# Patient Record
Sex: MALE | Race: WHITE | NOT HISPANIC OR LATINO | Employment: OTHER | URBAN - METROPOLITAN AREA
[De-identification: names, ages, dates, MRNs, and addresses within clinical notes are randomized per-mention and may not be internally consistent; named-entity substitution may affect disease eponyms.]

---

## 2019-06-03 ENCOUNTER — TELEPHONE (OUTPATIENT)
Dept: FAMILY MEDICINE | Facility: CLINIC | Age: 84
End: 2019-06-03

## 2019-06-03 ENCOUNTER — OFFICE VISIT (OUTPATIENT)
Dept: FAMILY MEDICINE | Facility: CLINIC | Age: 84
End: 2019-06-03
Attending: FAMILY MEDICINE
Payer: MEDICARE

## 2019-06-03 ENCOUNTER — HOSPITAL ENCOUNTER (OUTPATIENT)
Dept: RADIOLOGY | Facility: HOSPITAL | Age: 84
Discharge: HOME | End: 2019-06-03
Attending: FAMILY MEDICINE
Payer: MEDICARE

## 2019-06-03 VITALS
BODY MASS INDEX: 23.46 KG/M2 | WEIGHT: 167.56 LBS | OXYGEN SATURATION: 97 % | SYSTOLIC BLOOD PRESSURE: 138 MMHG | HEIGHT: 71 IN | DIASTOLIC BLOOD PRESSURE: 66 MMHG | TEMPERATURE: 98 F | HEART RATE: 67 BPM

## 2019-06-03 DIAGNOSIS — J01.40 ACUTE PANSINUSITIS, RECURRENCE NOT SPECIFIED: ICD-10-CM

## 2019-06-03 DIAGNOSIS — R06.02 SOB (SHORTNESS OF BREATH): Primary | ICD-10-CM

## 2019-06-03 DIAGNOSIS — E03.9 HYPOTHYROIDISM, UNSPECIFIED TYPE: ICD-10-CM

## 2019-06-03 DIAGNOSIS — J01.40 ACUTE PANSINUSITIS, RECURRENCE NOT SPECIFIED: Primary | ICD-10-CM

## 2019-06-03 DIAGNOSIS — R53.1 WEAKNESS: ICD-10-CM

## 2019-06-03 DIAGNOSIS — R06.02 SOB (SHORTNESS OF BREATH): ICD-10-CM

## 2019-06-03 PROBLEM — H40.9 GLAUCOMA: Status: ACTIVE | Noted: 2019-06-03

## 2019-06-03 PROCEDURE — 99999 PR PBB SHADOW E&M-NEW PATIENT-LVL IV: CPT | Mod: PBBFAC,,, | Performed by: FAMILY MEDICINE

## 2019-06-03 PROCEDURE — 99999 PR PBB SHADOW E&M-NEW PATIENT-LVL IV: ICD-10-PCS | Mod: PBBFAC,,, | Performed by: FAMILY MEDICINE

## 2019-06-03 PROCEDURE — 99204 OFFICE O/P NEW MOD 45 MIN: CPT | Mod: PBBFAC,25,PO | Performed by: FAMILY MEDICINE

## 2019-06-03 PROCEDURE — 71046 X-RAY EXAM CHEST 2 VIEWS: CPT | Mod: 26,,, | Performed by: RADIOLOGY

## 2019-06-03 PROCEDURE — 71046 XR CHEST PA AND LATERAL: ICD-10-PCS | Mod: 26,,, | Performed by: RADIOLOGY

## 2019-06-03 PROCEDURE — 99204 OFFICE O/P NEW MOD 45 MIN: CPT | Mod: S$PBB,,, | Performed by: FAMILY MEDICINE

## 2019-06-03 PROCEDURE — 99204 PR OFFICE/OUTPT VISIT, NEW, LEVL IV, 45-59 MIN: ICD-10-PCS | Mod: S$PBB,,, | Performed by: FAMILY MEDICINE

## 2019-06-03 PROCEDURE — 71046 X-RAY EXAM CHEST 2 VIEWS: CPT | Mod: TC,PO

## 2019-06-03 RX ORDER — BRIMONIDINE TARTRATE 2 MG/ML
1 SOLUTION/ DROPS OPHTHALMIC 2 TIMES DAILY
Refills: 4 | COMMUNITY
Start: 2019-04-02 | End: 2023-12-14 | Stop reason: SDUPTHER

## 2019-06-03 RX ORDER — LATANOPROST 50 UG/ML
1 SOLUTION/ DROPS OPHTHALMIC NIGHTLY
COMMUNITY
Start: 2018-08-31 | End: 2023-12-14 | Stop reason: SDUPTHER

## 2019-06-03 RX ORDER — BENZONATATE 200 MG/1
200 CAPSULE ORAL 2 TIMES DAILY PRN
Qty: 20 CAPSULE | Refills: 0 | Status: SHIPPED | OUTPATIENT
Start: 2019-06-03 | End: 2019-06-13

## 2019-06-03 RX ORDER — BRIMONIDINE TARTRATE AND TIMOLOL MALEATE 2; 5 MG/ML; MG/ML
1 SOLUTION OPHTHALMIC 2 TIMES DAILY
COMMUNITY
End: 2023-03-30

## 2019-06-03 RX ORDER — FINASTERIDE 5 MG/1
1 TABLET, FILM COATED ORAL DAILY
Refills: 11 | COMMUNITY
Start: 2019-05-11 | End: 2021-05-05

## 2019-06-03 RX ORDER — FLUTICASONE PROPIONATE 50 MCG
2 SPRAY, SUSPENSION (ML) NASAL
COMMUNITY
Start: 2018-04-02 | End: 2023-03-17 | Stop reason: ALTCHOICE

## 2019-06-03 RX ORDER — LEVOTHYROXINE SODIUM 75 UG/1
75 TABLET ORAL DAILY
Refills: 0 | COMMUNITY
Start: 2019-05-16 | End: 2019-10-16 | Stop reason: SDUPTHER

## 2019-06-03 RX ORDER — TIMOLOL MALEATE 5 MG/ML
1 SOLUTION/ DROPS OPHTHALMIC 2 TIMES DAILY
Refills: 4 | Status: ON HOLD | COMMUNITY
Start: 2019-04-02 | End: 2023-03-31 | Stop reason: HOSPADM

## 2019-06-03 RX ORDER — AMOXICILLIN AND CLAVULANATE POTASSIUM 875; 125 MG/1; MG/1
1 TABLET, FILM COATED ORAL EVERY 12 HOURS
Qty: 20 TABLET | Refills: 0 | Status: SHIPPED | OUTPATIENT
Start: 2019-06-03 | End: 2019-06-24 | Stop reason: ALTCHOICE

## 2019-06-03 NOTE — PROGRESS NOTES
Subjective:       Patient ID: Kwame Vásquez Jr. is a 89 y.o. male.    Chief Complaint: Cough (x 5 days) and Chest Congestion (x 5 days)    89 y old male with  Hypothyroidism, BPH , glaucoma resident at [facility] with non productive cough , chest congestion and malaise for 5 days . Taking tylenol . No cp , sob     Review of Systems   Constitutional: Negative.    HENT: Negative.    Eyes: Negative.    Respiratory: Positive for cough and chest tightness.    Cardiovascular: Negative.    Gastrointestinal: Negative.    Genitourinary: Negative.    Musculoskeletal: Negative.    Skin: Negative.    Hematological: Negative.        Objective:      Physical Exam   Constitutional: He is oriented to person, place, and time. He appears well-developed and well-nourished. No distress.   HENT:   Head: Normocephalic and atraumatic.   Right Ear: External ear normal.   Left Ear: External ear normal.   Nose: Nose normal.   Mouth/Throat: No oropharyngeal exudate.   Eyes: Pupils are equal, round, and reactive to light. Conjunctivae and EOM are normal. Right eye exhibits no discharge. Left eye exhibits no discharge. No scleral icterus.   Neck: Normal range of motion. Neck supple. No JVD present. No tracheal deviation present. No thyromegaly present.   Cardiovascular: Normal rate, regular rhythm, normal heart sounds and intact distal pulses. Exam reveals no gallop and no friction rub.   No murmur heard.  Pulmonary/Chest: Effort normal. No stridor. No respiratory distress. He has decreased breath sounds in the right upper field and the left upper field. He has wheezes in the right upper field and the right middle field. He has no rales. He exhibits no tenderness.   Abdominal: Soft. Bowel sounds are normal. He exhibits no distension. There is no tenderness. There is no rebound and no guarding.   Musculoskeletal: Normal range of motion. He exhibits no edema or tenderness.   Lymphadenopathy:     He has no cervical adenopathy.   Neurological:  He is alert and oriented to person, place, and time. He has normal reflexes. He displays normal reflexes. No cranial nerve deficit. He exhibits normal muscle tone. Coordination normal.   Skin: Skin is warm and dry. No rash noted. He is not diaphoretic. No erythema. No pallor.   Psychiatric: He has a normal mood and affect. His behavior is normal. Judgment and thought content normal.       Assessment:       SOB (shortness of breath)  -     X-Ray Chest PA And Lateral; Future; Expected date: 06/03/2019    Hypothyroidism, unspecified type    Acute pansinusitis, recurrence not specified    Other orders  -     amoxicillin-clavulanate 875-125mg (AUGMENTIN) 875-125 mg per tablet; Take 1 tablet by mouth every 12 (twelve) hours.  Dispense: 20 tablet; Refill: 0  -     benzonatate (TESSALON) 200 MG capsule; Take 1 capsule (200 mg total) by mouth 2 (two) times daily as needed for Cough.  Dispense: 20 capsule; Refill: 0      Plan:     Kwame was seen today for cough and chest congestion.    Diagnoses and all orders for this visit:    SOB (shortness of breath)  -     X-Ray Chest PA And Lateral; Future     /Call or return to clinic prn if these symptoms worsen or fail to improve as anticipated.

## 2019-06-03 NOTE — TELEPHONE ENCOUNTER
Pts granddaughter requesting an external referral for home health. Pts granddaughter Chanel states she would like the referral to be faxed to [facility] in [place]. Please advise.

## 2019-06-03 NOTE — TELEPHONE ENCOUNTER
Attempted to contact pts grandchild Chanel to informed her the referral has been faxed to [facility], no answer. Unable to leave message at this time.

## 2019-06-04 ENCOUNTER — TELEPHONE (OUTPATIENT)
Dept: FAMILY MEDICINE | Facility: CLINIC | Age: 84
End: 2019-06-04

## 2019-06-04 NOTE — TELEPHONE ENCOUNTER
----- Message from Charity Valdovinos sent at 6/4/2019  1:26 PM CDT -----  Contact: [facility]/Home Health/Nurse  Please call nurse @ [phone] regarding pt order for Home Health, states order not complete, information missing, nurse would like to speak with nurse Rodriguez.

## 2019-06-04 NOTE — TELEPHONE ENCOUNTER
----- Message from Karma Eldridge sent at 6/4/2019  3:09 PM CDT -----  Contact: [facility]  Zi is calling to speak with Jennifer in regards to referral that was sent for pt. No other details were given. Please call Zi back at [phone].    Thanks,   Karma Eldridge

## 2019-06-04 NOTE — TELEPHONE ENCOUNTER
Spoke with Zi at [facility], she states that they cannot admit patient for  services for a diagnosis of sinusitis. She states that they will contact the patient to let him know.

## 2019-06-04 NOTE — TELEPHONE ENCOUNTER
Spoke with Zi from [facility] requesting for Dr. Briscoe office note and H&P. She states the pt was recently discharged from their home health in May due to goals met. Verbalized understanding, office note and H&P faxed.

## 2019-06-24 ENCOUNTER — HOSPITAL ENCOUNTER (OUTPATIENT)
Dept: RADIOLOGY | Facility: HOSPITAL | Age: 84
Discharge: HOME | End: 2019-06-24
Attending: FAMILY MEDICINE
Payer: MEDICARE

## 2019-06-24 ENCOUNTER — OFFICE VISIT (OUTPATIENT)
Dept: FAMILY MEDICINE | Facility: CLINIC | Age: 84
End: 2019-06-24
Attending: FAMILY MEDICINE
Payer: MEDICARE

## 2019-06-24 VITALS
HEIGHT: 71 IN | BODY MASS INDEX: 23.95 KG/M2 | TEMPERATURE: 98 F | SYSTOLIC BLOOD PRESSURE: 142 MMHG | HEART RATE: 66 BPM | WEIGHT: 171.06 LBS | DIASTOLIC BLOOD PRESSURE: 64 MMHG | OXYGEN SATURATION: 97 %

## 2019-06-24 DIAGNOSIS — R29.6 FREQUENT FALLS: ICD-10-CM

## 2019-06-24 DIAGNOSIS — Z78.9 DECREASED ACTIVITIES OF DAILY LIVING (ADL): ICD-10-CM

## 2019-06-24 DIAGNOSIS — R79.9 ABNORMAL FINDING OF BLOOD CHEMISTRY: ICD-10-CM
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
## 2019-06-24 DIAGNOSIS — E03.9 HYPOTHYROIDISM, UNSPECIFIED TYPE: ICD-10-CM

## 2019-06-24 DIAGNOSIS — M94.9 DISORDER OF CARTILAGE: ICD-10-CM

## 2019-06-24 DIAGNOSIS — E78.5 DYSLIPIDEMIA: ICD-10-CM

## 2019-06-24 PROCEDURE — 99999 PR PBB SHADOW E&M-EST. PATIENT-LVL IV: ICD-10-PCS | Mod: PBBFAC,,, | Performed by: FAMILY MEDICINE

## 2019-06-24 PROCEDURE — 71110 X-RAY EXAM RIBS BIL 3 VIEWS: CPT | Mod: 26,,, | Performed by: RADIOLOGY

## 2019-06-24 PROCEDURE — 99214 OFFICE O/P EST MOD 30 MIN: CPT | Mod: S$PBB,,, | Performed by: FAMILY MEDICINE

## 2019-06-24 PROCEDURE — 99999 PR PBB SHADOW E&M-EST. PATIENT-LVL IV: CPT | Mod: PBBFAC,,, | Performed by: FAMILY MEDICINE

## 2019-06-24 PROCEDURE — 99214 PR OFFICE/OUTPT VISIT, EST, LEVL IV, 30-39 MIN: ICD-10-PCS | Mod: S$PBB,,, | Performed by: FAMILY MEDICINE

## 2019-06-24 PROCEDURE — 99214 OFFICE O/P EST MOD 30 MIN: CPT | Mod: PBBFAC,PO | Performed by: FAMILY MEDICINE

## 2019-06-24 PROCEDURE — 71110 XR RIBS 3 VIEWS BILATERAL: ICD-10-PCS | Mod: 26,,, | Performed by: RADIOLOGY

## 2019-06-24 PROCEDURE — 71110 X-RAY EXAM RIBS BIL 3 VIEWS: CPT | Mod: TC,PO

## 2019-06-25 ENCOUNTER — TELEPHONE (OUTPATIENT)
Dept: FAMILY MEDICINE | Facility: CLINIC | Age: 84
End: 2019-06-25

## 2019-06-25 NOTE — TELEPHONE ENCOUNTER
----- Message from Jennifer Churchill LPN sent at 6/25/2019  3:52 PM CDT -----  Spoke to pts grandchild Chanel about pts test results. Chanel verbalized understanding. Chanel states she would like to have him start the spirometer. Chanel asking if his lung expansion is age appropriate or if it may be from the recent infection? She wants to know what is causing his lungs to not expand properly? Please advise.

## 2019-06-25 NOTE — TELEPHONE ENCOUNTER
----- Message from Sandy Tirado sent at 6/25/2019 11:37 AM CDT -----  Contact: [facility]  States a order was received on this pt but states a face to face visit notes are needed, please fax to [phone], can can be reached at [phone]///[email or web address]

## 2019-06-27 ENCOUNTER — TELEPHONE (OUTPATIENT)
Dept: FAMILY MEDICINE | Facility: CLINIC | Age: 84
End: 2019-06-27

## 2019-06-27 NOTE — TELEPHONE ENCOUNTER
Spoke to pts grand daughter Chanel about pts test results. Chanel verbalized understanding. Chanel states Luz called her and told her the spirometer is not covered under the insurance. Informed Chanel I can place a spirometer at the  for .

## 2019-06-27 NOTE — TELEPHONE ENCOUNTER
----- Message from Elizabeth Boles sent at 6/27/2019  8:11 AM CDT -----  Contact: Chanel/ayesha granddaughter   Caller states that she need the nurse to call her regarding Spirometer that she has to  and if it can be left at the .   [phone]

## 2019-06-28 ENCOUNTER — TELEPHONE (OUTPATIENT)
Dept: FAMILY MEDICINE | Facility: CLINIC | Age: 84
End: 2019-06-28

## 2019-06-28 DIAGNOSIS — R29.6 FREQUENT FALLS: Primary | ICD-10-CM

## 2019-06-28 NOTE — TELEPHONE ENCOUNTER
Spoke with Angelina at [facility], she states that she needs a new order for  dated for 6/25/19 and noted that it is okay to admit patient on 6/26/19. She states that the original date was on 6/24/19 but referral was faxed to them on 6/25/19 and they didn't admit patient until 6/26/19. She states that the state requires 24 hours from order to admit.

## 2019-07-01 ENCOUNTER — TELEPHONE (OUTPATIENT)
Dept: FAMILY MEDICINE | Facility: CLINIC | Age: 84
End: 2019-07-01

## 2019-07-01 DIAGNOSIS — J98.11 ATELECTASIS: ICD-10-CM

## 2019-07-01 DIAGNOSIS — R29.6 FREQUENT FALLS: Primary | ICD-10-CM

## 2019-07-01 NOTE — TELEPHONE ENCOUNTER
Spoke with Pts grand daughter Chanel. Chanel states she is wanting to switch pts [facility] to a company with Respiratory Therapy services. Informed Chanel I will make a few phone calls to different home health services and call her back as soon as I get a respiratory therapist service with [facility]. Chanel verbalized understanding.

## 2019-07-01 NOTE — TELEPHONE ENCOUNTER
----- Message from Kathy Howe sent at 7/1/2019 10:39 AM CDT -----  Type:  Needs Medical Advice    Who Called:  Pt daughter in law (Seda Sierra)  Symptoms (please be specific):     How long has patient had these symptoms:     Pharmacy name and phone #:     Would the patient rather a call back or a response via [facility]?  Call back  Best Call Back Number:   [phone]  Additional Information:   This message is for Lola// is calling regarding home health for pt//please call to discuss//rivas/lukas

## 2019-07-01 NOTE — TELEPHONE ENCOUNTER
Pts grand daughter stating they are wanting to switch [facility]. Company. Please place external referral for home health. Unable to use previous [facility] Referral.

## 2019-07-02 ENCOUNTER — TELEPHONE (OUTPATIENT)
Dept: FAMILY MEDICINE | Facility: CLINIC | Age: 84
End: 2019-07-02

## 2019-07-02 DIAGNOSIS — R29.6 FREQUENT FALLS: Primary | ICD-10-CM

## 2019-07-02 DIAGNOSIS — J98.11 ATELECTASIS: ICD-10-CM

## 2019-07-02 NOTE — TELEPHONE ENCOUNTER
[facility] Requesting orders for a nurse to make visits in the pts ome and also orders for respiratory therapy. Is it okay to give verbal order?

## 2019-07-02 NOTE — TELEPHONE ENCOUNTER
[facility] States since this pt is a new pt to [facility]. The nurse orders and resp therapy orders must be faxed on a hard copy. Please print hard copy.

## 2019-07-03 PROCEDURE — G0180 PR HOME HEALTH MD CERTIFICATION: ICD-10-PCS | Mod: ,,, | Performed by: FAMILY MEDICINE

## 2019-07-03 PROCEDURE — G0180 MD CERTIFICATION HHA PATIENT: HCPCS | Mod: ,,, | Performed by: FAMILY MEDICINE

## 2019-07-16 ENCOUNTER — TELEPHONE (OUTPATIENT)
Dept: FAMILY MEDICINE | Facility: CLINIC | Age: 84
End: 2019-07-16

## 2019-07-16 NOTE — TELEPHONE ENCOUNTER
Attempted to contact pts grandchild Chanel, unable to leave message at this time. Pt needing to come sooner or reschedule his appt for 7/18/19 due to Dr. Mccallum having to leave the office early.

## 2019-07-17 ENCOUNTER — TELEPHONE (OUTPATIENT)
Dept: FAMILY MEDICINE | Facility: CLINIC | Age: 84
End: 2019-07-17

## 2019-07-18 ENCOUNTER — TELEPHONE (OUTPATIENT)
Dept: FAMILY MEDICINE | Facility: CLINIC | Age: 84
End: 2019-07-18

## 2019-07-18 ENCOUNTER — OFFICE VISIT (OUTPATIENT)
Dept: FAMILY MEDICINE | Facility: CLINIC | Age: 84
End: 2019-07-18
Attending: FAMILY MEDICINE
Payer: MEDICARE

## 2019-07-18 VITALS
WEIGHT: 166.31 LBS | SYSTOLIC BLOOD PRESSURE: 130 MMHG | OXYGEN SATURATION: 97 % | DIASTOLIC BLOOD PRESSURE: 64 MMHG | HEART RATE: 68 BPM | TEMPERATURE: 98 F | BODY MASS INDEX: 23.53 KG/M2

## 2019-07-18 DIAGNOSIS — R29.6 FREQUENT FALLS: Primary | ICD-10-CM

## 2019-07-18 DIAGNOSIS — E03.9 HYPOTHYROIDISM, UNSPECIFIED TYPE: ICD-10-CM

## 2019-07-18 DIAGNOSIS — J30.9 ALLERGIC RHINITIS, UNSPECIFIED SEASONALITY, UNSPECIFIED TRIGGER: ICD-10-CM

## 2019-07-18 PROCEDURE — 99999 PR PBB SHADOW E&M-EST. PATIENT-LVL III: CPT | Mod: PBBFAC,,, | Performed by: FAMILY MEDICINE

## 2019-07-18 PROCEDURE — 99999 PR PBB SHADOW E&M-EST. PATIENT-LVL III: ICD-10-PCS | Mod: PBBFAC,,, | Performed by: FAMILY MEDICINE

## 2019-07-18 PROCEDURE — G0009 ADMIN PNEUMOCOCCAL VACCINE: HCPCS | Mod: PBBFAC,PO

## 2019-07-18 PROCEDURE — 99214 PR OFFICE/OUTPT VISIT, EST, LEVL IV, 30-39 MIN: ICD-10-PCS | Mod: 25,S$PBB,, | Performed by: FAMILY MEDICINE

## 2019-07-18 PROCEDURE — 99214 OFFICE O/P EST MOD 30 MIN: CPT | Mod: 25,S$PBB,, | Performed by: FAMILY MEDICINE

## 2019-07-18 PROCEDURE — 99213 OFFICE O/P EST LOW 20 MIN: CPT | Mod: PBBFAC,PO,25 | Performed by: FAMILY MEDICINE

## 2019-07-18 RX ORDER — LEVOTHYROXINE SODIUM 75 UG/1
75 TABLET ORAL
COMMUNITY
Start: 2019-07-09 | End: 2019-10-31

## 2019-07-18 NOTE — TELEPHONE ENCOUNTER
Spoke with pt's grand daughter, Chanel, she states that patient will be late for his 11 am appointment since her dad will be bringing patient. Advised her that if pt is here before 11:30 am then he can be seen. She states that he will definitely be here for then.

## 2019-07-18 NOTE — PROGRESS NOTES
Subjective:       Patient ID: Kwame Vásquez Jr. is a 89 y.o. male.    Chief Complaint: Follow-up    89  y  Old male with hypothyroidism , frequent falls and allergic rhinitis here for f.u . Has started PT twice weekly . No recent  falls. Eating well . Allergies are well controlled.     Review of Systems   Constitutional: Negative.  Negative for activity change and unexpected weight change.   HENT: Negative.  Negative for hearing loss, rhinorrhea and trouble swallowing.    Eyes: Negative.  Negative for discharge and visual disturbance.   Respiratory: Negative.  Negative for chest tightness and wheezing.    Cardiovascular: Negative.  Negative for chest pain and palpitations.   Gastrointestinal: Negative.  Negative for blood in stool, constipation, diarrhea and vomiting.   Endocrine: Negative for polydipsia and polyuria.   Genitourinary: Negative.  Negative for difficulty urinating, hematuria and urgency.   Musculoskeletal: Negative.  Negative for arthralgias, joint swelling and neck pain.   Skin: Negative.    Neurological: Negative for weakness and headaches.   Hematological: Negative.    Psychiatric/Behavioral: Negative for confusion and dysphoric mood.       Objective:      Physical Exam   Constitutional: He is oriented to person, place, and time. He appears well-developed and well-nourished. No distress.   HENT:   Head: Normocephalic and atraumatic.   Right Ear: External ear normal.   Left Ear: External ear normal.   Nose: Nose normal.   Mouth/Throat: No oropharyngeal exudate.   Eyes: Pupils are equal, round, and reactive to light. Conjunctivae and EOM are normal. Right eye exhibits no discharge. Left eye exhibits no discharge. No scleral icterus.   Neck: Normal range of motion. Neck supple. No JVD present. No tracheal deviation present. No thyromegaly present.   Cardiovascular: Normal rate, regular rhythm, normal heart sounds and intact distal pulses. Exam reveals no gallop and no friction rub.   No murmur  heard.  Pulmonary/Chest: Effort normal and breath sounds normal. No stridor. No respiratory distress. He has no wheezes. He has no rales. He exhibits no tenderness.   Abdominal: Soft. Bowel sounds are normal. He exhibits no distension. There is no tenderness. There is no rebound and no guarding.   Musculoskeletal: Normal range of motion. He exhibits no edema or tenderness.   Lymphadenopathy:     He has no cervical adenopathy.   Neurological: He is alert and oriented to person, place, and time. He has normal reflexes. He displays normal reflexes. No cranial nerve deficit. He exhibits normal muscle tone. Coordination normal.   Skin: Skin is warm and dry. No rash noted. He is not diaphoretic. No erythema. No pallor.   Psychiatric: He has a normal mood and affect. His behavior is normal. Judgment and thought content normal.       Assessment:       1. Frequent falls    2. Hypothyroidism, unspecified type    3. Allergic rhinitis, unspecified seasonality, unspecified trigger        Plan:     Kwame was seen today for follow-up.    Diagnoses and all orders for this visit:    Frequent falls    Hypothyroidism, unspecified type    Allergic rhinitis, unspecified seasonality, unspecified trigger    Other orders  -     (In Office Administered) Pneumococcal Polysaccharide Vaccine (23 Valent) (SQ/IM)     Cont PT . F.u in 3 m   Labs   Controlled. Cont med

## 2019-07-18 NOTE — TELEPHONE ENCOUNTER
----- Message from Ann Ruff sent at 7/18/2019  9:56 AM CDT -----  Contact: [phone]  Grand daughter called pt will be late due to transportations. No later 15 mins. please call back [phone].       Thank You,   Ann Ruff

## 2019-07-19 ENCOUNTER — EXTERNAL HOME HEALTH (OUTPATIENT)
Dept: HOME HEALTH SERVICES | Facility: HOSPITAL | Age: 84
End: 2019-07-19
Payer: MEDICARE

## 2019-08-27 ENCOUNTER — TELEPHONE (OUTPATIENT)
Dept: HOME HEALTH SERVICES | Facility: HOSPITAL | Age: 84
End: 2019-08-27

## 2019-10-16 RX ORDER — LEVOTHYROXINE SODIUM 75 UG/1
75 TABLET ORAL DAILY
Qty: 90 TABLET | Refills: 0 | Status: SHIPPED | OUTPATIENT
Start: 2019-10-16 | End: 2019-10-31

## 2019-10-18 ENCOUNTER — OFFICE VISIT (OUTPATIENT)
Dept: FAMILY MEDICINE | Facility: CLINIC | Age: 84
End: 2019-10-18
Attending: FAMILY MEDICINE
Payer: MEDICARE

## 2019-10-18 VITALS
TEMPERATURE: 98 F | DIASTOLIC BLOOD PRESSURE: 64 MMHG | BODY MASS INDEX: 23.61 KG/M2 | SYSTOLIC BLOOD PRESSURE: 118 MMHG | WEIGHT: 168.63 LBS | HEART RATE: 60 BPM | HEIGHT: 71 IN | OXYGEN SATURATION: 98 %

## 2019-10-18 DIAGNOSIS — R79.9 ABNORMAL FINDING OF BLOOD CHEMISTRY, UNSPECIFIED: ICD-10-CM

## 2019-10-18 DIAGNOSIS — E03.9 HYPOTHYROIDISM, UNSPECIFIED TYPE: Primary | ICD-10-CM

## 2019-10-18 DIAGNOSIS — E78.5 DYSLIPIDEMIA: ICD-10-CM

## 2019-10-18 DIAGNOSIS — H40.9 GLAUCOMA, UNSPECIFIED GLAUCOMA TYPE, UNSPECIFIED LATERALITY: ICD-10-CM

## 2019-10-18 PROCEDURE — 90662 IIV NO PRSV INCREASED AG IM: CPT | Mod: PBBFAC,PO

## 2019-10-18 PROCEDURE — 99999 PR PBB SHADOW E&M-EST. PATIENT-LVL III: ICD-10-PCS | Mod: PBBFAC,,, | Performed by: FAMILY MEDICINE

## 2019-10-18 PROCEDURE — 99214 OFFICE O/P EST MOD 30 MIN: CPT | Mod: 25,S$PBB,, | Performed by: FAMILY MEDICINE

## 2019-10-18 PROCEDURE — 99999 PR PBB SHADOW E&M-EST. PATIENT-LVL III: CPT | Mod: PBBFAC,,, | Performed by: FAMILY MEDICINE

## 2019-10-18 PROCEDURE — 99214 PR OFFICE/OUTPT VISIT, EST, LEVL IV, 30-39 MIN: ICD-10-PCS | Mod: 25,S$PBB,, | Performed by: FAMILY MEDICINE

## 2019-10-18 PROCEDURE — 99213 OFFICE O/P EST LOW 20 MIN: CPT | Mod: PBBFAC,PO,25 | Performed by: FAMILY MEDICINE

## 2019-10-18 RX ORDER — POLYMYXIN B SULFATE AND TRIMETHOPRIM 1; 10000 MG/ML; [USP'U]/ML
1 SOLUTION OPHTHALMIC 3 TIMES DAILY
Refills: 3 | COMMUNITY
Start: 2019-09-15 | End: 2023-03-30

## 2019-10-18 NOTE — PROGRESS NOTES
Subjective:       Patient ID: Kwame Vásquez Jr. is a 89 y.o. male.    Chief Complaint: Follow-up    89 y old male with BPH ,  r eye prothesis, glaucoma ,hypothyroidism , dlp here for f.u . Doing well . No recent falls. Upset about loosing his vision . Normal appetites , sleeping well . Sees Dr Marie  ( Opth) . current eye exam    Review of Systems   Constitutional: Negative.    HENT: Negative.    Eyes: Negative.    Respiratory: Negative.    Cardiovascular: Negative.    Gastrointestinal: Negative.    Genitourinary: Negative.    Musculoskeletal: Negative.    Skin: Negative.    Hematological: Negative.        Objective:      Physical Exam   Constitutional: He is oriented to person, place, and time. He appears well-developed and well-nourished. No distress.   HENT:   Head: Normocephalic and atraumatic.   Right Ear: External ear normal.   Left Ear: External ear normal.   Nose: Nose normal.   Mouth/Throat: No oropharyngeal exudate.   Eyes: Pupils are equal, round, and reactive to light. Conjunctivae and EOM are normal. Right eye exhibits no discharge. Left eye exhibits no discharge. No scleral icterus.   Neck: Normal range of motion. Neck supple. No JVD present. No tracheal deviation present. No thyromegaly present.   Cardiovascular: Normal rate, regular rhythm, normal heart sounds and intact distal pulses. Exam reveals no gallop and no friction rub.   No murmur heard.  Pulmonary/Chest: Effort normal and breath sounds normal. No stridor. No respiratory distress. He has no wheezes. He has no rales. He exhibits no tenderness.   Abdominal: Soft. Bowel sounds are normal. He exhibits no distension. There is no tenderness. There is no rebound and no guarding.   Musculoskeletal: Normal range of motion. He exhibits no edema or tenderness.   Lymphadenopathy:     He has no cervical adenopathy.   Neurological: He is alert and oriented to person, place, and time. He has normal reflexes. He displays normal reflexes. No cranial  nerve deficit. He exhibits normal muscle tone. Coordination normal.   Skin: Skin is warm and dry. No rash noted. He is not diaphoretic. No erythema. No pallor.   Psychiatric: He has a normal mood and affect. His behavior is normal. Judgment and thought content normal.       Assessment:       Hypothyroidism, unspecified type  -     TSH; Future; Expected date: 10/18/2019    Dyslipidemia  -     CBC auto differential; Future; Expected date: 10/18/2019  -     Comprehensive metabolic panel; Future; Expected date: 10/18/2019  -     Hemoglobin A1c; Future; Expected date: 10/18/2019  -     Lipid panel; Future; Expected date: 10/18/2019    Abnormal finding of blood chemistry, unspecified   -     Hemoglobin A1c; Future; Expected date: 10/18/2019    Glaucoma, unspecified glaucoma type, unspecified laterality    Other orders  -     Influenza - High Dose (65+) (PF) (IM)      Plan:     Kwame was seen today for follow-up.    Diagnoses and all orders for this visit:    Hypothyroidism, unspecified type  -     TSH; Future    Dyslipidemia  -     CBC auto differential; Future  -     Comprehensive metabolic panel; Future  -     Hemoglobin A1c; Future  -     Lipid panel; Future    Abnormal finding of blood chemistry, unspecified   -     Hemoglobin A1c; Future     cont med   Labs   F.u with OPth.

## 2019-10-24 ENCOUNTER — LAB VISIT (OUTPATIENT)
Dept: LAB | Facility: HOSPITAL | Age: 84
End: 2019-10-24
Attending: FAMILY MEDICINE
Payer: MEDICARE

## 2019-10-24 DIAGNOSIS — E03.9 HYPOTHYROIDISM, UNSPECIFIED TYPE: ICD-10-CM

## 2019-10-24 DIAGNOSIS — E78.5 DYSLIPIDEMIA: ICD-10-CM

## 2019-10-24 DIAGNOSIS — R79.9 ABNORMAL FINDING OF BLOOD CHEMISTRY, UNSPECIFIED: ICD-10-CM

## 2019-10-24 LAB
ALBUMIN SERPL BCP-MCNC: 3.7 G/DL (ref 3.5–5.2)
ALP SERPL-CCNC: 70 U/L (ref 55–135)
ALT SERPL W/O P-5'-P-CCNC: 13 U/L (ref 10–44)
ANION GAP SERPL CALC-SCNC: 7 MMOL/L (ref 8–16)
AST SERPL-CCNC: 21 U/L (ref 10–40)
BASOPHILS # BLD AUTO: 0.03 K/UL (ref 0–0.2)
BASOPHILS NFR BLD: 0.4 % (ref 0–1.9)
BILIRUB SERPL-MCNC: 1.6 MG/DL (ref 0.1–1)
BUN SERPL-MCNC: 12 MG/DL (ref 10–30)
CALCIUM SERPL-MCNC: 9.1 MG/DL (ref 8.7–10.5)
CHLORIDE SERPL-SCNC: 100 MMOL/L (ref 95–110)
CHOLEST SERPL-MCNC: 205 MG/DL (ref 120–199)
CHOLEST/HDLC SERPL: 3.9 {RATIO} (ref 2–5)
CO2 SERPL-SCNC: 30 MMOL/L (ref 23–29)
CREAT SERPL-MCNC: 0.9 MG/DL (ref 0.5–1.4)
DIFFERENTIAL METHOD: ABNORMAL
EOSINOPHIL # BLD AUTO: 0.1 K/UL (ref 0–0.5)
EOSINOPHIL NFR BLD: 1.1 % (ref 0–8)
ERYTHROCYTE [DISTWIDTH] IN BLOOD BY AUTOMATED COUNT: 13.2 % (ref 11.5–14.5)
EST. GFR  (AFRICAN AMERICAN): >60 ML/MIN/1.73 M^2
EST. GFR  (NON AFRICAN AMERICAN): >60 ML/MIN/1.73 M^2
ESTIMATED AVG GLUCOSE: 154 MG/DL (ref 68–131)
GLUCOSE SERPL-MCNC: 142 MG/DL (ref 70–110)
HBA1C MFR BLD HPLC: 7 % (ref 4–5.6)
HCT VFR BLD AUTO: 43.7 % (ref 40–54)
HDLC SERPL-MCNC: 52 MG/DL (ref 40–75)
HDLC SERPL: 25.4 % (ref 20–50)
HGB BLD-MCNC: 14.5 G/DL (ref 14–18)
IMM GRANULOCYTES # BLD AUTO: 0.04 K/UL (ref 0–0.04)
IMM GRANULOCYTES NFR BLD AUTO: 0.5 % (ref 0–0.5)
LDLC SERPL CALC-MCNC: 132.8 MG/DL (ref 63–159)
LYMPHOCYTES # BLD AUTO: 1 K/UL (ref 1–4.8)
LYMPHOCYTES NFR BLD: 12 % (ref 18–48)
MCH RBC QN AUTO: 29.3 PG (ref 27–31)
MCHC RBC AUTO-ENTMCNC: 33.2 G/DL (ref 32–36)
MCV RBC AUTO: 88 FL (ref 82–98)
MONOCYTES # BLD AUTO: 0.6 K/UL (ref 0.3–1)
MONOCYTES NFR BLD: 7.4 % (ref 4–15)
NEUTROPHILS # BLD AUTO: 6.5 K/UL (ref 1.8–7.7)
NEUTROPHILS NFR BLD: 78.6 % (ref 38–73)
NONHDLC SERPL-MCNC: 153 MG/DL
NRBC BLD-RTO: 0 /100 WBC
PLATELET # BLD AUTO: 140 K/UL (ref 150–350)
PMV BLD AUTO: 12.3 FL (ref 9.2–12.9)
POTASSIUM SERPL-SCNC: 4.3 MMOL/L (ref 3.5–5.1)
PROT SERPL-MCNC: 7.6 G/DL (ref 6–8.4)
RBC # BLD AUTO: 4.95 M/UL (ref 4.6–6.2)
SODIUM SERPL-SCNC: 137 MMOL/L (ref 136–145)
T4 FREE SERPL-MCNC: 1.03 NG/DL (ref 0.71–1.51)
TRIGL SERPL-MCNC: 101 MG/DL (ref 30–150)
TSH SERPL DL<=0.005 MIU/L-ACNC: 6.26 UIU/ML (ref 0.4–4)
WBC # BLD AUTO: 8.26 K/UL (ref 3.9–12.7)

## 2019-10-24 PROCEDURE — 85025 COMPLETE CBC W/AUTO DIFF WBC: CPT

## 2019-10-24 PROCEDURE — 84443 ASSAY THYROID STIM HORMONE: CPT

## 2019-10-24 PROCEDURE — 36415 COLL VENOUS BLD VENIPUNCTURE: CPT | Mod: PO

## 2019-10-24 PROCEDURE — 83036 HEMOGLOBIN GLYCOSYLATED A1C: CPT

## 2019-10-24 PROCEDURE — 84439 ASSAY OF FREE THYROXINE: CPT

## 2019-10-24 PROCEDURE — 80053 COMPREHEN METABOLIC PANEL: CPT

## 2019-10-24 PROCEDURE — 80061 LIPID PANEL: CPT

## 2019-10-31 ENCOUNTER — OFFICE VISIT (OUTPATIENT)
Dept: FAMILY MEDICINE | Facility: CLINIC | Age: 84
End: 2019-10-31
Attending: FAMILY MEDICINE
Payer: MEDICARE

## 2019-10-31 VITALS
TEMPERATURE: 98 F | SYSTOLIC BLOOD PRESSURE: 122 MMHG | OXYGEN SATURATION: 96 % | DIASTOLIC BLOOD PRESSURE: 62 MMHG | HEART RATE: 72 BPM | HEIGHT: 71 IN | WEIGHT: 168.56 LBS | BODY MASS INDEX: 23.6 KG/M2

## 2019-10-31 DIAGNOSIS — E03.9 HYPOTHYROIDISM, UNSPECIFIED TYPE: Primary | ICD-10-CM

## 2019-10-31 DIAGNOSIS — D75.839 THROMBOCYTHEMIA: ICD-10-CM

## 2019-10-31 DIAGNOSIS — E78.5 DM TYPE 2 WITH DIABETIC DYSLIPIDEMIA: ICD-10-CM

## 2019-10-31 DIAGNOSIS — E11.69 DM TYPE 2 WITH DIABETIC DYSLIPIDEMIA: ICD-10-CM

## 2019-10-31 LAB
ALBUMIN/CREAT UR: 15.3 UG/MG (ref 0–30)
CREAT UR-MCNC: 190 MG/DL (ref 23–375)
MICROALBUMIN UR DL<=1MG/L-MCNC: 29 UG/ML

## 2019-10-31 PROCEDURE — 99214 PR OFFICE/OUTPT VISIT, EST, LEVL IV, 30-39 MIN: ICD-10-PCS | Mod: S$PBB,,, | Performed by: FAMILY MEDICINE

## 2019-10-31 PROCEDURE — 99999 PR PBB SHADOW E&M-EST. PATIENT-LVL III: CPT | Mod: PBBFAC,,, | Performed by: FAMILY MEDICINE

## 2019-10-31 PROCEDURE — 99214 OFFICE O/P EST MOD 30 MIN: CPT | Mod: S$PBB,,, | Performed by: FAMILY MEDICINE

## 2019-10-31 PROCEDURE — 99999 PR PBB SHADOW E&M-EST. PATIENT-LVL III: ICD-10-PCS | Mod: PBBFAC,,, | Performed by: FAMILY MEDICINE

## 2019-10-31 PROCEDURE — 99213 OFFICE O/P EST LOW 20 MIN: CPT | Mod: PBBFAC,PO | Performed by: FAMILY MEDICINE

## 2019-10-31 PROCEDURE — 82043 UR ALBUMIN QUANTITATIVE: CPT

## 2019-10-31 RX ORDER — LEVOTHYROXINE SODIUM 88 UG/1
75 TABLET ORAL DAILY
Qty: 30 TABLET | Refills: 2 | Status: SHIPPED | OUTPATIENT
Start: 2019-10-31 | End: 2020-01-13 | Stop reason: SDUPTHER

## 2019-10-31 NOTE — PROGRESS NOTES
Subjective:       Patient ID: Kwame Vásquez Jr. is a 89 y.o. male.    Chief Complaint: Follow-up    89 y old male with hypothyroidism here to go over most recent labs ( t2 dm , uncontrolled tsh , dlp and mild thrombocytopenia) . He has a sweet tooth . He was told bs were elevated 2 y ago . He did diet for a short  period of time  which helped  normalize  the glucose. Denies  any easy bruising , easy bleeding . Sees Dr Carranza  for his glaucoma  . Also has a podiatrist     Review of Systems   Constitutional: Negative.    HENT: Negative.    Eyes: Negative.    Respiratory: Negative.    Cardiovascular: Negative.    Gastrointestinal: Negative.    Genitourinary: Negative.    Musculoskeletal: Negative.    Skin: Negative.    Hematological: Negative.        Objective:      Physical Exam   Constitutional: He is oriented to person, place, and time. No distress.   HENT:   Head: Normocephalic and atraumatic.   Right Ear: External ear normal.   Left Ear: External ear normal.   Nose: Nose normal.   Mouth/Throat: No oropharyngeal exudate.   Eyes: Pupils are equal, round, and reactive to light. Conjunctivae and EOM are normal. Right eye exhibits no discharge. Left eye exhibits no discharge. No scleral icterus.   Neck: Normal range of motion. Neck supple. No JVD present. No tracheal deviation present. No thyromegaly present.   Cardiovascular: Normal rate, regular rhythm, normal heart sounds and intact distal pulses. Exam reveals no gallop and no friction rub.   No murmur heard.  Pulses:       Dorsalis pedis pulses are 2+ on the right side, and 2+ on the left side.        Posterior tibial pulses are 2+ on the right side, and 2+ on the left side.   Pulmonary/Chest: Effort normal and breath sounds normal. No stridor. No respiratory distress. He has no wheezes. He has no rales. He exhibits no tenderness.   Abdominal: Soft. Bowel sounds are normal. He exhibits no distension. There is no tenderness. There is no rebound and no guarding.    Musculoskeletal: Normal range of motion. He exhibits no edema or tenderness.        Right foot: There is normal range of motion and no deformity.        Left foot: There is normal range of motion and no deformity.   Feet:   Right Foot:   Protective Sensation: 10 sites tested. 10 sites sensed.   Skin Integrity: Negative for ulcer, blister, skin breakdown, erythema, warmth or callus.   Left Foot:   Protective Sensation: 10 sites tested. 10 sites sensed.   Skin Integrity: Positive for erythema. Negative for ulcer, blister, skin breakdown, warmth, callus or dry skin.   Lymphadenopathy:     He has no cervical adenopathy.   Neurological: He is alert and oriented to person, place, and time. He has normal reflexes. He displays normal reflexes. No cranial nerve deficit. He exhibits normal muscle tone. Coordination normal.   Skin: Skin is warm and dry. No rash noted. He is not diaphoretic. No erythema. No pallor.   Psychiatric: He has a normal mood and affect. His behavior is normal. Judgment and thought content normal.       Assessment:       Hypothyroidism, unspecified type    DM type 2 with diabetic dyslipidemia  -     Microalbumin/creatinine urine ratio    Thrombocythemia    Other orders  -     levothyroxine (SYNTHROID) 88 MCG tablet; Take 1 tablet (88 mcg total) by mouth once daily.  Dispense: 30 tablet; Refill: 2      Plan:     Kwame was seen today for follow-up.    Diagnoses and all orders for this visit:    DM type 2 with diabetic dyslipidemia     uncontrolled. Increase levothyroxine   Diet and ex   Will monitor.   F.u in 3 m

## 2020-01-13 RX ORDER — LEVOTHYROXINE SODIUM 88 UG/1
75 TABLET ORAL DAILY
Qty: 30 TABLET | Refills: 2 | Status: SHIPPED | OUTPATIENT
Start: 2020-01-13 | End: 2020-02-03 | Stop reason: SDUPTHER

## 2020-01-31 ENCOUNTER — LAB VISIT (OUTPATIENT)
Dept: LAB | Facility: HOSPITAL | Age: 85
End: 2020-01-31
Attending: FAMILY MEDICINE
Payer: MEDICARE

## 2020-01-31 ENCOUNTER — TELEPHONE (OUTPATIENT)
Dept: FAMILY MEDICINE | Facility: CLINIC | Age: 85
End: 2020-01-31

## 2020-01-31 ENCOUNTER — OFFICE VISIT (OUTPATIENT)
Dept: FAMILY MEDICINE | Facility: CLINIC | Age: 85
End: 2020-01-31
Attending: FAMILY MEDICINE
Payer: MEDICARE

## 2020-01-31 VITALS
HEIGHT: 71 IN | DIASTOLIC BLOOD PRESSURE: 72 MMHG | OXYGEN SATURATION: 97 % | TEMPERATURE: 97 F | HEART RATE: 55 BPM | SYSTOLIC BLOOD PRESSURE: 126 MMHG | WEIGHT: 167.25 LBS | BODY MASS INDEX: 23.41 KG/M2

## 2020-01-31 DIAGNOSIS — E78.5 DM TYPE 2 WITH DIABETIC DYSLIPIDEMIA: Primary | ICD-10-CM

## 2020-01-31 DIAGNOSIS — H10.9 CONJUNCTIVITIS, UNSPECIFIED CONJUNCTIVITIS TYPE, UNSPECIFIED LATERALITY: ICD-10-CM

## 2020-01-31 DIAGNOSIS — E03.9 HYPOTHYROIDISM, UNSPECIFIED TYPE: ICD-10-CM

## 2020-01-31 DIAGNOSIS — E11.69 DM TYPE 2 WITH DIABETIC DYSLIPIDEMIA: ICD-10-CM

## 2020-01-31 DIAGNOSIS — E11.69 DM TYPE 2 WITH DIABETIC DYSLIPIDEMIA: Primary | ICD-10-CM

## 2020-01-31 DIAGNOSIS — E78.5 DM TYPE 2 WITH DIABETIC DYSLIPIDEMIA: ICD-10-CM

## 2020-01-31 LAB
ALBUMIN SERPL BCP-MCNC: 3.7 G/DL (ref 3.5–5.2)
ALP SERPL-CCNC: 62 U/L (ref 55–135)
ALT SERPL W/O P-5'-P-CCNC: 14 U/L (ref 10–44)
ANION GAP SERPL CALC-SCNC: 8 MMOL/L (ref 8–16)
AST SERPL-CCNC: 20 U/L (ref 10–40)
BASOPHILS # BLD AUTO: 0.02 K/UL (ref 0–0.2)
BASOPHILS NFR BLD: 0.3 % (ref 0–1.9)
BILIRUB SERPL-MCNC: 1.9 MG/DL (ref 0.1–1)
BUN SERPL-MCNC: 14 MG/DL (ref 10–30)
CALCIUM SERPL-MCNC: 9.1 MG/DL (ref 8.7–10.5)
CHLORIDE SERPL-SCNC: 102 MMOL/L (ref 95–110)
CHOLEST SERPL-MCNC: 200 MG/DL (ref 120–199)
CHOLEST/HDLC SERPL: 3.6 {RATIO} (ref 2–5)
CO2 SERPL-SCNC: 30 MMOL/L (ref 23–29)
CREAT SERPL-MCNC: 0.9 MG/DL (ref 0.5–1.4)
DIFFERENTIAL METHOD: ABNORMAL
EOSINOPHIL # BLD AUTO: 0.1 K/UL (ref 0–0.5)
EOSINOPHIL NFR BLD: 0.9 % (ref 0–8)
ERYTHROCYTE [DISTWIDTH] IN BLOOD BY AUTOMATED COUNT: 12.7 % (ref 11.5–14.5)
EST. GFR  (AFRICAN AMERICAN): >60 ML/MIN/1.73 M^2
EST. GFR  (NON AFRICAN AMERICAN): >60 ML/MIN/1.73 M^2
ESTIMATED AVG GLUCOSE: 151 MG/DL (ref 68–131)
GLUCOSE SERPL-MCNC: 139 MG/DL (ref 70–110)
HBA1C MFR BLD HPLC: 6.9 % (ref 4–5.6)
HCT VFR BLD AUTO: 44.8 % (ref 40–54)
HDLC SERPL-MCNC: 55 MG/DL (ref 40–75)
HDLC SERPL: 27.5 % (ref 20–50)
HGB BLD-MCNC: 14.2 G/DL (ref 14–18)
IMM GRANULOCYTES # BLD AUTO: 0.03 K/UL (ref 0–0.04)
IMM GRANULOCYTES NFR BLD AUTO: 0.4 % (ref 0–0.5)
LDLC SERPL CALC-MCNC: 128 MG/DL (ref 63–159)
LYMPHOCYTES # BLD AUTO: 1 K/UL (ref 1–4.8)
LYMPHOCYTES NFR BLD: 13.3 % (ref 18–48)
MCH RBC QN AUTO: 29.1 PG (ref 27–31)
MCHC RBC AUTO-ENTMCNC: 31.7 G/DL (ref 32–36)
MCV RBC AUTO: 92 FL (ref 82–98)
MONOCYTES # BLD AUTO: 0.6 K/UL (ref 0.3–1)
MONOCYTES NFR BLD: 7.4 % (ref 4–15)
NEUTROPHILS # BLD AUTO: 5.8 K/UL (ref 1.8–7.7)
NEUTROPHILS NFR BLD: 77.7 % (ref 38–73)
NONHDLC SERPL-MCNC: 145 MG/DL
NRBC BLD-RTO: 0 /100 WBC
PLATELET # BLD AUTO: 127 K/UL (ref 150–350)
PMV BLD AUTO: 12.7 FL (ref 9.2–12.9)
POTASSIUM SERPL-SCNC: 4.5 MMOL/L (ref 3.5–5.1)
PROT SERPL-MCNC: 7.6 G/DL (ref 6–8.4)
RBC # BLD AUTO: 4.88 M/UL (ref 4.6–6.2)
SODIUM SERPL-SCNC: 140 MMOL/L (ref 136–145)
T4 FREE SERPL-MCNC: 1.09 NG/DL (ref 0.71–1.51)
TRIGL SERPL-MCNC: 85 MG/DL (ref 30–150)
TSH SERPL DL<=0.005 MIU/L-ACNC: 4.18 UIU/ML (ref 0.4–4)
WBC # BLD AUTO: 7.44 K/UL (ref 3.9–12.7)

## 2020-01-31 PROCEDURE — 80061 LIPID PANEL: CPT

## 2020-01-31 PROCEDURE — 85025 COMPLETE CBC W/AUTO DIFF WBC: CPT

## 2020-01-31 PROCEDURE — 99213 OFFICE O/P EST LOW 20 MIN: CPT | Mod: PBBFAC,PO | Performed by: FAMILY MEDICINE

## 2020-01-31 PROCEDURE — 99999 PR PBB SHADOW E&M-EST. PATIENT-LVL III: CPT | Mod: PBBFAC,,, | Performed by: FAMILY MEDICINE

## 2020-01-31 PROCEDURE — 84443 ASSAY THYROID STIM HORMONE: CPT

## 2020-01-31 PROCEDURE — 84439 ASSAY OF FREE THYROXINE: CPT

## 2020-01-31 PROCEDURE — 36415 COLL VENOUS BLD VENIPUNCTURE: CPT | Mod: PO

## 2020-01-31 PROCEDURE — 83036 HEMOGLOBIN GLYCOSYLATED A1C: CPT

## 2020-01-31 PROCEDURE — 99214 PR OFFICE/OUTPT VISIT, EST, LEVL IV, 30-39 MIN: ICD-10-PCS | Mod: S$PBB,,, | Performed by: FAMILY MEDICINE

## 2020-01-31 PROCEDURE — 80053 COMPREHEN METABOLIC PANEL: CPT

## 2020-01-31 PROCEDURE — 99214 OFFICE O/P EST MOD 30 MIN: CPT | Mod: S$PBB,,, | Performed by: FAMILY MEDICINE

## 2020-01-31 PROCEDURE — 99999 PR PBB SHADOW E&M-EST. PATIENT-LVL III: ICD-10-PCS | Mod: PBBFAC,,, | Performed by: FAMILY MEDICINE

## 2020-01-31 RX ORDER — OFLOXACIN 3 MG/ML
1 SOLUTION/ DROPS OPHTHALMIC 4 TIMES DAILY
Qty: 5 ML | Refills: 0 | Status: SHIPPED | OUTPATIENT
Start: 2020-01-31 | End: 2020-03-04 | Stop reason: SDUPTHER

## 2020-01-31 NOTE — TELEPHONE ENCOUNTER
I spoke with Granddaughter she stated that due to his senile debility and other issues the family would like to do Palliative care and be able to have more insight to what is going on with him, since her father is his primary source of his needs. She also stated that her father is not as young as he used to be and he lives in [place]

## 2020-01-31 NOTE — TELEPHONE ENCOUNTER
abx drops sent to pharmacy . Prn f.u . Please find out why Granddaughter feels that he would  benefit form  hospice ( call GD )

## 2020-01-31 NOTE — PROGRESS NOTES
Subjective:       Patient ID: Kwame Vásquez Jr. is a 89 y.o. male.    Chief Complaint: Follow-up    89 y old male with hypothyroidism , pre dm , bph and glaucoma here for f.u . He has a sweet tooth .  On regular diet .  Sees Dr Carranza  for his glaucoma  . Also has a podiatrist . He is a resident at [facility] . Also would like us to look at his left eye and r eye prosthesis to make sure that they are not red . No pain , no discharge .        Review of Systems   Constitutional: Negative.    HENT: Negative.    Eyes: Positive for visual disturbance.   Respiratory: Negative.    Cardiovascular: Negative.    Gastrointestinal: Negative.    Genitourinary: Negative.    Musculoskeletal: Negative.    Skin: Negative.    Hematological: Negative.        Objective:      Physical Exam   Constitutional: He is oriented to person, place, and time. He appears well-developed and well-nourished. No distress.   HENT:   Head: Normocephalic and atraumatic.   Right Ear: External ear normal.   Left Ear: External ear normal.   Nose: Nose normal.   Mouth/Throat: No oropharyngeal exudate.   Eyes: Pupils are equal, round, and reactive to light. EOM are normal. Right eye exhibits exudate. Right eye exhibits no discharge. Left eye exhibits exudate. Left eye exhibits no discharge. Right conjunctiva is injected. Left conjunctiva is injected. No scleral icterus.   Neck: Normal range of motion. Neck supple. No JVD present. No tracheal deviation present. No thyromegaly present.   Cardiovascular: Normal rate, regular rhythm, normal heart sounds and intact distal pulses. Exam reveals no gallop and no friction rub.   No murmur heard.  Pulmonary/Chest: Effort normal and breath sounds normal. No stridor. No respiratory distress. He has no wheezes. He has no rales. He exhibits no tenderness.   Abdominal: Soft. Bowel sounds are normal. He exhibits no distension. There is no tenderness. There is no rebound and no guarding.   Musculoskeletal: Normal range  of motion. He exhibits no edema or tenderness.   Lymphadenopathy:     He has no cervical adenopathy.   Neurological: He is alert and oriented to person, place, and time. He has normal reflexes. He displays normal reflexes. No cranial nerve deficit. He exhibits normal muscle tone. Coordination normal.   Skin: Skin is warm and dry. No rash noted. He is not diaphoretic. No erythema. No pallor.   Psychiatric: He has a normal mood and affect. His behavior is normal. Judgment and thought content normal.       Assessment:      Kwame was seen today for follow-up.    Diagnoses and all orders for this visit:    DM type 2 with diabetic dyslipidemia  -     CBC auto differential; Future  -     Comprehensive metabolic panel; Future  -     Hemoglobin A1c; Future  -     Lipid panel; Future    Hypothyroidism, unspecified type  -     TSH; Future    Conjunctivitis, unspecified conjunctivitis type, unspecified laterality    Other orders  -     ofloxacin (OCUFLOX) 0.3 % ophthalmic solution; Place 1 drop into both eyes 4 (four) times daily.      Plan:   dite and ex   Labs   Prn follow up

## 2020-02-03 ENCOUNTER — TELEPHONE (OUTPATIENT)
Dept: FAMILY MEDICINE | Facility: CLINIC | Age: 85
End: 2020-02-03

## 2020-02-03 RX ORDER — LEVOTHYROXINE SODIUM 100 UG/1
TABLET ORAL
Qty: 90 TABLET | Refills: 0 | Status: SHIPPED | OUTPATIENT
Start: 2020-02-03 | End: 2023-03-17 | Stop reason: SDUPTHER

## 2020-02-13 ENCOUNTER — TELEPHONE (OUTPATIENT)
Dept: FAMILY MEDICINE | Facility: CLINIC | Age: 85
End: 2020-02-13

## 2020-02-13 DIAGNOSIS — R54 SENILE DEBILITY: Primary | ICD-10-CM

## 2020-03-04 RX ORDER — OFLOXACIN 3 MG/ML
1 SOLUTION/ DROPS OPHTHALMIC 4 TIMES DAILY
Qty: 5 ML | Refills: 0 | Status: SHIPPED | OUTPATIENT
Start: 2020-03-04 | End: 2020-04-03

## 2020-03-04 NOTE — TELEPHONE ENCOUNTER
Received fax from [employer] that patient accidentally threw eye drops away and home health nurse told him to continue this.

## 2020-03-24 RX ORDER — LEVOTHYROXINE SODIUM 88 UG/1
75 TABLET ORAL DAILY
Qty: 30 TABLET | Refills: 2 | Status: SHIPPED | OUTPATIENT
Start: 2020-03-24 | End: 2020-05-13

## 2020-04-03 RX ORDER — OFLOXACIN 3 MG/ML
SOLUTION/ DROPS OPHTHALMIC
Qty: 5 ML | Refills: 0 | Status: SHIPPED | OUTPATIENT
Start: 2020-04-03 | End: 2023-03-30

## 2020-05-13 RX ORDER — LEVOTHYROXINE SODIUM 88 UG/1
TABLET ORAL
Qty: 30 TABLET | Refills: 2 | Status: SHIPPED | OUTPATIENT
Start: 2020-05-13 | End: 2020-08-10

## 2020-12-17 RX ORDER — LEVOTHYROXINE SODIUM 88 UG/1
TABLET ORAL
Qty: 30 TABLET | Refills: 3 | Status: SHIPPED | OUTPATIENT
Start: 2020-12-17 | End: 2023-03-17 | Stop reason: ALTCHOICE

## 2021-02-24 ENCOUNTER — PATIENT OUTREACH (OUTPATIENT)
Dept: ADMINISTRATIVE | Facility: HOSPITAL | Age: 86
End: 2021-02-24

## 2021-03-22 ENCOUNTER — HOSPITAL ENCOUNTER (EMERGENCY)
Facility: HOSPITAL | Age: 86
Discharge: HOME | End: 2021-03-22
Attending: EMERGENCY MEDICINE
Payer: MEDICARE

## 2021-03-22 VITALS
BODY MASS INDEX: 24.39 KG/M2 | DIASTOLIC BLOOD PRESSURE: 67 MMHG | SYSTOLIC BLOOD PRESSURE: 176 MMHG | OXYGEN SATURATION: 96 % | TEMPERATURE: 98 F | HEART RATE: 65 BPM | RESPIRATION RATE: 16 BRPM | HEIGHT: 70 IN | WEIGHT: 170.38 LBS

## 2021-03-22 DIAGNOSIS — S42.001A CLOSED DISPLACED FRACTURE OF RIGHT CLAVICLE, UNSPECIFIED PART OF CLAVICLE, INITIAL ENCOUNTER: Primary | ICD-10-CM

## 2021-03-22 DIAGNOSIS — S43.101A SEPARATION OF RIGHT ACROMIOCLAVICULAR JOINT, INITIAL ENCOUNTER: ICD-10-CM

## 2021-03-22 DIAGNOSIS — M25.511 RIGHT SHOULDER PAIN: ICD-10-CM

## 2021-03-22 PROCEDURE — 99284 EMERGENCY DEPT VISIT MOD MDM: CPT | Mod: 25

## 2021-03-22 RX ORDER — SENNOSIDES 8.6 MG/1
1 TABLET ORAL DAILY
COMMUNITY

## 2021-03-25 ENCOUNTER — PATIENT MESSAGE (OUTPATIENT)
Dept: ADMINISTRATIVE | Facility: HOSPITAL | Age: 86
End: 2021-03-25

## 2021-04-13 ENCOUNTER — PATIENT OUTREACH (OUTPATIENT)
Dept: ADMINISTRATIVE | Facility: OTHER | Age: 86
End: 2021-04-13

## 2021-04-13 ENCOUNTER — HOSPITAL ENCOUNTER (OUTPATIENT)
Dept: RADIOLOGY | Facility: HOSPITAL | Age: 86
Discharge: HOME | End: 2021-04-13
Attending: PHYSICIAN ASSISTANT
Payer: MEDICARE

## 2021-04-13 ENCOUNTER — OFFICE VISIT (OUTPATIENT)
Dept: ORTHOPEDICS | Facility: CLINIC | Age: 86
End: 2021-04-13
Payer: MEDICARE

## 2021-04-13 VITALS
WEIGHT: 170 LBS | DIASTOLIC BLOOD PRESSURE: 60 MMHG | BODY MASS INDEX: 24.34 KG/M2 | HEIGHT: 70 IN | SYSTOLIC BLOOD PRESSURE: 128 MMHG | HEART RATE: 64 BPM

## 2021-04-13 DIAGNOSIS — M25.511 RIGHT SHOULDER PAIN, UNSPECIFIED CHRONICITY: ICD-10-CM

## 2021-04-13 DIAGNOSIS — M25.511 RIGHT SHOULDER PAIN, UNSPECIFIED CHRONICITY: Primary | ICD-10-CM

## 2021-04-13 DIAGNOSIS — E78.5 DM TYPE 2 WITH DIABETIC DYSLIPIDEMIA: Primary | ICD-10-CM

## 2021-04-13 DIAGNOSIS — E11.69 DM TYPE 2 WITH DIABETIC DYSLIPIDEMIA: Primary | ICD-10-CM

## 2021-04-13 DIAGNOSIS — M89.8X1 PAIN OF RIGHT CLAVICLE: Primary | ICD-10-CM

## 2021-04-13 PROCEDURE — 73000 XR CLAVICLE RIGHT: ICD-10-PCS | Mod: 26,GW,RT, | Performed by: RADIOLOGY

## 2021-04-13 PROCEDURE — 73000 X-RAY EXAM OF COLLAR BONE: CPT | Mod: 26,GW,RT, | Performed by: RADIOLOGY

## 2021-04-13 PROCEDURE — 99214 OFFICE O/P EST MOD 30 MIN: CPT | Mod: PBBFAC,25 | Performed by: PHYSICIAN ASSISTANT

## 2021-04-13 PROCEDURE — 99999 PR PBB SHADOW E&M-EST. PATIENT-LVL IV: CPT | Mod: PBBFAC,,, | Performed by: PHYSICIAN ASSISTANT

## 2021-04-13 PROCEDURE — 99213 PR OFFICE/OUTPT VISIT, EST, LEVL III, 20-29 MIN: ICD-10-PCS | Mod: GW,S$PBB,ICN, | Performed by: PHYSICIAN ASSISTANT

## 2021-04-13 PROCEDURE — 99213 OFFICE O/P EST LOW 20 MIN: CPT | Mod: GW,S$PBB,ICN, | Performed by: PHYSICIAN ASSISTANT

## 2021-04-13 PROCEDURE — 99999 PR PBB SHADOW E&M-EST. PATIENT-LVL IV: ICD-10-PCS | Mod: PBBFAC,,, | Performed by: PHYSICIAN ASSISTANT

## 2021-04-13 PROCEDURE — 73000 X-RAY EXAM OF COLLAR BONE: CPT | Mod: TC,RT

## 2021-05-05 ENCOUNTER — OFFICE VISIT (OUTPATIENT)
Dept: ORTHOPEDICS | Facility: CLINIC | Age: 86
End: 2021-05-05
Payer: MEDICARE

## 2021-05-05 ENCOUNTER — HOSPITAL ENCOUNTER (OUTPATIENT)
Dept: RADIOLOGY | Facility: HOSPITAL | Age: 86
Discharge: HOME | End: 2021-05-05
Attending: PHYSICIAN ASSISTANT
Payer: MEDICARE

## 2021-05-05 VITALS
BODY MASS INDEX: 24.34 KG/M2 | HEART RATE: 77 BPM | DIASTOLIC BLOOD PRESSURE: 63 MMHG | HEIGHT: 70 IN | SYSTOLIC BLOOD PRESSURE: 123 MMHG | WEIGHT: 170 LBS

## 2021-05-05 DIAGNOSIS — M89.8X1 PAIN OF RIGHT CLAVICLE: ICD-10-CM

## 2021-05-05 DIAGNOSIS — S42.021G: Primary | ICD-10-CM

## 2021-05-05 PROCEDURE — 73000 XR CLAVICLE RIGHT: ICD-10-PCS | Mod: 26,GW,RT, | Performed by: RADIOLOGY

## 2021-05-05 PROCEDURE — 73000 X-RAY EXAM OF COLLAR BONE: CPT | Mod: TC,RT

## 2021-05-05 PROCEDURE — 99999 PR PBB SHADOW E&M-EST. PATIENT-LVL III: ICD-10-PCS | Mod: PBBFAC,,, | Performed by: ORTHOPAEDIC SURGERY

## 2021-05-05 PROCEDURE — 99213 PR OFFICE/OUTPT VISIT, EST, LEVL III, 20-29 MIN: ICD-10-PCS | Mod: S$PBB,GW,ICN, | Performed by: ORTHOPAEDIC SURGERY

## 2021-05-05 PROCEDURE — 99213 OFFICE O/P EST LOW 20 MIN: CPT | Mod: PBBFAC,25 | Performed by: ORTHOPAEDIC SURGERY

## 2021-05-05 PROCEDURE — 99213 OFFICE O/P EST LOW 20 MIN: CPT | Mod: S$PBB,GW,ICN, | Performed by: ORTHOPAEDIC SURGERY

## 2021-05-05 PROCEDURE — 73000 X-RAY EXAM OF COLLAR BONE: CPT | Mod: 26,GW,RT, | Performed by: RADIOLOGY

## 2021-05-05 PROCEDURE — 99999 PR PBB SHADOW E&M-EST. PATIENT-LVL III: CPT | Mod: PBBFAC,,, | Performed by: ORTHOPAEDIC SURGERY

## 2021-08-04 ENCOUNTER — PATIENT MESSAGE (OUTPATIENT)
Dept: ADMINISTRATIVE | Facility: HOSPITAL | Age: 86
End: 2021-08-04

## 2022-04-27 ENCOUNTER — PATIENT MESSAGE (OUTPATIENT)
Dept: ADMINISTRATIVE | Facility: HOSPITAL | Age: 87
End: 2022-04-27
Payer: MEDICARE

## 2023-03-17 ENCOUNTER — LAB VISIT (OUTPATIENT)
Dept: LAB | Facility: HOSPITAL | Age: 88
End: 2023-03-17
Attending: FAMILY MEDICINE
Payer: MEDICARE

## 2023-03-17 ENCOUNTER — OFFICE VISIT (OUTPATIENT)
Dept: FAMILY MEDICINE | Facility: CLINIC | Age: 88
End: 2023-03-17
Payer: MEDICARE

## 2023-03-17 VITALS
HEIGHT: 70 IN | HEART RATE: 60 BPM | BODY MASS INDEX: 24.39 KG/M2 | DIASTOLIC BLOOD PRESSURE: 70 MMHG | TEMPERATURE: 99 F | SYSTOLIC BLOOD PRESSURE: 130 MMHG | OXYGEN SATURATION: 97 %

## 2023-03-17 DIAGNOSIS — E11.69 DM TYPE 2 WITH DIABETIC DYSLIPIDEMIA: ICD-10-CM

## 2023-03-17 DIAGNOSIS — Z12.5 SCREENING FOR MALIGNANT NEOPLASM OF PROSTATE: ICD-10-CM

## 2023-03-17 DIAGNOSIS — E78.5 DM TYPE 2 WITH DIABETIC DYSLIPIDEMIA: ICD-10-CM

## 2023-03-17 DIAGNOSIS — R54 SENILE DEBILITY: ICD-10-CM

## 2023-03-17 DIAGNOSIS — E11.69 DM TYPE 2 WITH DIABETIC DYSLIPIDEMIA: Primary | ICD-10-CM

## 2023-03-17 DIAGNOSIS — J30.9 ALLERGIC RHINITIS, UNSPECIFIED SEASONALITY, UNSPECIFIED TRIGGER: ICD-10-CM

## 2023-03-17 DIAGNOSIS — R20.2 PARESTHESIA: ICD-10-CM

## 2023-03-17 DIAGNOSIS — F51.01 PRIMARY INSOMNIA: ICD-10-CM

## 2023-03-17 DIAGNOSIS — E78.5 DM TYPE 2 WITH DIABETIC DYSLIPIDEMIA: Primary | ICD-10-CM

## 2023-03-17 DIAGNOSIS — N40.0 BENIGN PROSTATIC HYPERPLASIA, UNSPECIFIED WHETHER LOWER URINARY TRACT SYMPTOMS PRESENT: ICD-10-CM

## 2023-03-17 DIAGNOSIS — E03.9 HYPOTHYROIDISM, UNSPECIFIED TYPE: ICD-10-CM

## 2023-03-17 LAB
ALBUMIN SERPL BCP-MCNC: 3.2 G/DL (ref 3.5–5.2)
ALP SERPL-CCNC: 71 U/L (ref 55–135)
ALT SERPL W/O P-5'-P-CCNC: 21 U/L (ref 10–44)
ANION GAP SERPL CALC-SCNC: 6 MMOL/L (ref 8–16)
AST SERPL-CCNC: 22 U/L (ref 10–40)
BASOPHILS # BLD AUTO: 0.03 K/UL (ref 0–0.2)
BASOPHILS NFR BLD: 0.4 % (ref 0–1.9)
BILIRUB SERPL-MCNC: 1.1 MG/DL (ref 0.1–1)
BUN SERPL-MCNC: 22 MG/DL (ref 10–30)
CALCIUM SERPL-MCNC: 8.7 MG/DL (ref 8.7–10.5)
CHLORIDE SERPL-SCNC: 103 MMOL/L (ref 95–110)
CHOLEST SERPL-MCNC: 175 MG/DL (ref 120–199)
CHOLEST/HDLC SERPL: 3.7 {RATIO} (ref 2–5)
CO2 SERPL-SCNC: 31 MMOL/L (ref 23–29)
COMPLEXED PSA SERPL-MCNC: 1.6 NG/ML (ref 0–4)
CREAT SERPL-MCNC: 0.8 MG/DL (ref 0.5–1.4)
DIFFERENTIAL METHOD: ABNORMAL
EOSINOPHIL # BLD AUTO: 0.1 K/UL (ref 0–0.5)
EOSINOPHIL NFR BLD: 1.9 % (ref 0–8)
ERYTHROCYTE [DISTWIDTH] IN BLOOD BY AUTOMATED COUNT: 13.2 % (ref 11.5–14.5)
EST. GFR  (NO RACE VARIABLE): >60 ML/MIN/1.73 M^2
ESTIMATED AVG GLUCOSE: 134 MG/DL (ref 68–131)
GLUCOSE SERPL-MCNC: 100 MG/DL (ref 70–110)
HBA1C MFR BLD: 6.3 % (ref 4–5.6)
HCT VFR BLD AUTO: 42.2 % (ref 40–54)
HDLC SERPL-MCNC: 47 MG/DL (ref 40–75)
HDLC SERPL: 26.9 % (ref 20–50)
HGB BLD-MCNC: 13.2 G/DL (ref 14–18)
IMM GRANULOCYTES # BLD AUTO: 0.03 K/UL (ref 0–0.04)
IMM GRANULOCYTES NFR BLD AUTO: 0.4 % (ref 0–0.5)
LDLC SERPL CALC-MCNC: 111.2 MG/DL (ref 63–159)
LYMPHOCYTES # BLD AUTO: 1.1 K/UL (ref 1–4.8)
LYMPHOCYTES NFR BLD: 14.9 % (ref 18–48)
MCH RBC QN AUTO: 28.9 PG (ref 27–31)
MCHC RBC AUTO-ENTMCNC: 31.3 G/DL (ref 32–36)
MCV RBC AUTO: 93 FL (ref 82–98)
MONOCYTES # BLD AUTO: 0.5 K/UL (ref 0.3–1)
MONOCYTES NFR BLD: 6.6 % (ref 4–15)
NEUTROPHILS # BLD AUTO: 5.7 K/UL (ref 1.8–7.7)
NEUTROPHILS NFR BLD: 75.8 % (ref 38–73)
NONHDLC SERPL-MCNC: 128 MG/DL
NRBC BLD-RTO: 0 /100 WBC
PLATELET # BLD AUTO: 141 K/UL (ref 150–450)
PMV BLD AUTO: 12.4 FL (ref 9.2–12.9)
POTASSIUM SERPL-SCNC: 4.8 MMOL/L (ref 3.5–5.1)
PROT SERPL-MCNC: 7.1 G/DL (ref 6–8.4)
RBC # BLD AUTO: 4.56 M/UL (ref 4.6–6.2)
SODIUM SERPL-SCNC: 140 MMOL/L (ref 136–145)
TRIGL SERPL-MCNC: 84 MG/DL (ref 30–150)
VIT B12 SERPL-MCNC: 522 PG/ML (ref 210–950)
WBC # BLD AUTO: 7.52 K/UL (ref 3.9–12.7)

## 2023-03-17 PROCEDURE — 81003 URINALYSIS AUTO W/O SCOPE: CPT | Performed by: NURSE PRACTITIONER

## 2023-03-17 PROCEDURE — 99215 OFFICE O/P EST HI 40 MIN: CPT | Mod: S$PBB,,, | Performed by: NURSE PRACTITIONER

## 2023-03-17 PROCEDURE — 80053 COMPREHEN METABOLIC PANEL: CPT | Performed by: NURSE PRACTITIONER

## 2023-03-17 PROCEDURE — 36415 COLL VENOUS BLD VENIPUNCTURE: CPT | Mod: PO | Performed by: NURSE PRACTITIONER

## 2023-03-17 PROCEDURE — 80061 LIPID PANEL: CPT | Performed by: NURSE PRACTITIONER

## 2023-03-17 PROCEDURE — 99213 OFFICE O/P EST LOW 20 MIN: CPT | Mod: PBBFAC,PO | Performed by: NURSE PRACTITIONER

## 2023-03-17 PROCEDURE — 82607 VITAMIN B-12: CPT | Performed by: NURSE PRACTITIONER

## 2023-03-17 PROCEDURE — 85025 COMPLETE CBC W/AUTO DIFF WBC: CPT | Performed by: NURSE PRACTITIONER

## 2023-03-17 PROCEDURE — 84153 ASSAY OF PSA TOTAL: CPT | Performed by: NURSE PRACTITIONER

## 2023-03-17 PROCEDURE — 99999 PR PBB SHADOW E&M-EST. PATIENT-LVL III: CPT | Mod: PBBFAC,,, | Performed by: NURSE PRACTITIONER

## 2023-03-17 PROCEDURE — 82570 ASSAY OF URINE CREATININE: CPT | Performed by: NURSE PRACTITIONER

## 2023-03-17 PROCEDURE — 83036 HEMOGLOBIN GLYCOSYLATED A1C: CPT | Performed by: NURSE PRACTITIONER

## 2023-03-17 PROCEDURE — 99999 PR PBB SHADOW E&M-EST. PATIENT-LVL III: ICD-10-PCS | Mod: PBBFAC,,, | Performed by: NURSE PRACTITIONER

## 2023-03-17 PROCEDURE — 99215 PR OFFICE/OUTPT VISIT, EST, LEVL V, 40-54 MIN: ICD-10-PCS | Mod: S$PBB,,, | Performed by: NURSE PRACTITIONER

## 2023-03-17 RX ORDER — TAMSULOSIN HYDROCHLORIDE 0.4 MG/1
1 CAPSULE ORAL DAILY
Qty: 90 CAPSULE | Refills: 3 | Status: SHIPPED | OUTPATIENT
Start: 2023-03-17 | End: 2023-12-14 | Stop reason: SDUPTHER

## 2023-03-17 RX ORDER — FINASTERIDE 5 MG/1
5 TABLET, FILM COATED ORAL
COMMUNITY
Start: 2023-02-23 | End: 2023-03-17 | Stop reason: SDUPTHER

## 2023-03-17 RX ORDER — TEMAZEPAM 30 MG/1
30 CAPSULE ORAL NIGHTLY
COMMUNITY
Start: 2023-03-01 | End: 2023-03-17 | Stop reason: ALTCHOICE

## 2023-03-17 RX ORDER — FINASTERIDE 5 MG/1
5 TABLET, FILM COATED ORAL DAILY
Qty: 90 TABLET | Refills: 3 | Status: SHIPPED | OUTPATIENT
Start: 2023-03-17 | End: 2023-12-14 | Stop reason: SDUPTHER

## 2023-03-17 RX ORDER — TAMSULOSIN HYDROCHLORIDE 0.4 MG/1
1 CAPSULE ORAL
COMMUNITY
Start: 2023-02-08 | End: 2023-03-17 | Stop reason: SDUPTHER

## 2023-03-17 RX ORDER — MIRTAZAPINE 7.5 MG/1
TABLET, FILM COATED ORAL
Qty: 30 TABLET | Refills: 0 | Status: SHIPPED | OUTPATIENT
Start: 2023-03-17 | End: 2023-03-30

## 2023-03-17 RX ORDER — LEVOTHYROXINE SODIUM 100 UG/1
TABLET ORAL
Qty: 90 TABLET | Refills: 3 | Status: SHIPPED | OUTPATIENT
Start: 2023-03-17 | End: 2023-06-29 | Stop reason: DRUGHIGH

## 2023-03-17 RX ORDER — AZELASTINE 1 MG/ML
1 SPRAY, METERED NASAL 2 TIMES DAILY
Qty: 30 ML | Refills: 2 | Status: SHIPPED | OUTPATIENT
Start: 2023-03-17 | End: 2024-03-16

## 2023-03-17 RX ORDER — CETIRIZINE HYDROCHLORIDE 10 MG/1
10 TABLET ORAL DAILY PRN
COMMUNITY
Start: 2023-03-06 | End: 2023-03-17 | Stop reason: ALTCHOICE

## 2023-03-21 LAB
ALBUMIN/CREAT UR: 14 UG/MG (ref 0–30)
BILIRUB UR QL STRIP: NEGATIVE
CLARITY UR REFRACT.AUTO: CLEAR
COLOR UR AUTO: YELLOW
CREAT UR-MCNC: 107 MG/DL (ref 23–375)
GLUCOSE UR QL STRIP: NEGATIVE
HGB UR QL STRIP: NEGATIVE
KETONES UR QL STRIP: NEGATIVE
LEUKOCYTE ESTERASE UR QL STRIP: NEGATIVE
MICROALBUMIN UR DL<=1MG/L-MCNC: 15 UG/ML
NITRITE UR QL STRIP: NEGATIVE
PH UR STRIP: 6 [PH] (ref 5–8)
PROT UR QL STRIP: NEGATIVE
SP GR UR STRIP: 1.02 (ref 1–1.03)
URN SPEC COLLECT METH UR: NORMAL

## 2023-03-23 ENCOUNTER — TELEPHONE (OUTPATIENT)
Dept: FAMILY MEDICINE | Facility: CLINIC | Age: 88
End: 2023-03-23
Payer: MEDICARE

## 2023-03-23 DIAGNOSIS — E03.9 HYPOTHYROIDISM, UNSPECIFIED TYPE: ICD-10-CM

## 2023-03-23 DIAGNOSIS — R54 SENILE DEBILITY: Primary | ICD-10-CM

## 2023-03-23 NOTE — TELEPHONE ENCOUNTER
----- Message from Anayeli Tirado sent at 3/23/2023 11:42 AM CDT -----  Contact: daughter [phone]  Patient daughter in law called in requesting orders for  TSH blood test and orders for a DME for a Hospital bed. It is to be taken to [facility] in [place]. Please call back [phone]. Thanks sylvia

## 2023-03-27 ENCOUNTER — LAB VISIT (OUTPATIENT)
Dept: LAB | Facility: HOSPITAL | Age: 88
End: 2023-03-27
Attending: NURSE PRACTITIONER
Payer: MEDICARE

## 2023-03-27 DIAGNOSIS — E03.9 HYPOTHYROIDISM, UNSPECIFIED TYPE: ICD-10-CM

## 2023-03-27 LAB
T4 FREE SERPL-MCNC: 1.06 NG/DL (ref 0.71–1.51)
TSH SERPL DL<=0.005 MIU/L-ACNC: 3.13 UIU/ML (ref 0.4–4)

## 2023-03-27 PROCEDURE — 36415 COLL VENOUS BLD VENIPUNCTURE: CPT | Mod: PO | Performed by: NURSE PRACTITIONER

## 2023-03-27 PROCEDURE — 84443 ASSAY THYROID STIM HORMONE: CPT | Performed by: NURSE PRACTITIONER

## 2023-03-27 PROCEDURE — 84439 ASSAY OF FREE THYROXINE: CPT | Performed by: NURSE PRACTITIONER

## 2023-03-27 NOTE — PROGRESS NOTES
Subjective:       Patient ID: Kwame Vásquez Jr. is a 89 y.o. male.    Chief Complaint: No chief complaint on file.  Pt reports to clinic for chronic care visit.  Previously on palliative care but recently discharged.  Now in assisted living with care giver.  Needs helps with ADLs.  Non smoker, continent of bowel or bladder.  Does not monitor glucose      Past Medical History:   Diagnosis Date    Glaucoma     Hypothyroidism       Active Problem List with Overview Notes    Diagnosis Date Noted    Traumatic closed displaced fracture of shaft of clavicle with delayed healing, right 05/05/2021    DM type 2 with diabetic dyslipidemia 10/31/2019    Hypothyroid 06/03/2019    Glaucoma 06/03/2019    Primary insomnia 10/28/2016    Senile debility 10/28/2016    Allergic rhinitis 04/02/2015    Irritable bowel syndrome 02/18/2014    BPH (benign prostatic hyperplasia) 05/01/2012    Hyperlipemia 05/01/2012      HPI  Review of Systems   Constitutional:  Negative for unexpected weight change.   HENT:  Positive for rhinorrhea.    Respiratory:  Positive for cough.    Cardiovascular:  Negative for chest pain, palpitations and leg swelling.   Gastrointestinal: Negative.    Genitourinary:  Negative for difficulty urinating and dysuria.   Musculoskeletal:  Positive for arthralgias and back pain.   Skin: Negative.    Neurological:  Positive for weakness. Negative for speech difficulty and numbness.   Psychiatric/Behavioral: Negative.       Objective:      Physical Exam  Vitals reviewed.   HENT:      Head: Normocephalic.      Right Ear: External ear normal.      Left Ear: External ear normal.      Nose: Rhinorrhea present.   Eyes:      Extraocular Movements: Extraocular movements intact.   Cardiovascular:      Rate and Rhythm: Normal rate.      Heart sounds: Normal heart sounds.   Pulmonary:      Effort: Pulmonary effort is normal.      Breath sounds: Normal breath sounds.   Musculoskeletal:      Cervical back: Normal range of motion.       Comments: wheelchair   Skin:     General: Skin is warm and dry.   Neurological:      Mental Status: He is alert and oriented to person, place, and time.   Psychiatric:         Behavior: Behavior normal.       Assessment:       1. DM type 2 with diabetic dyslipidemia    2. Benign prostatic hyperplasia, unspecified whether lower urinary tract symptoms present    3. Hypothyroidism, unspecified type    4. Primary insomnia    5. Senile debility    6. Allergic rhinitis, unspecified seasonality, unspecified trigger    7. Screening for malignant neoplasm of prostate    8. Paresthesia          Plan:   DM type 2 with diabetic dyslipidemia  -     CBC Auto Differential; Future; Expected date: 03/17/2023  -     Comprehensive Metabolic Panel; Future; Expected date: 03/17/2023  -     Hemoglobin A1C; Future; Expected date: 03/17/2023  -     Lipid Panel; Future; Expected date: 03/17/2023  -     MICROALBUMIN / CREATININE RATIO URINE  -     Urinalysis; Future; Expected date: 03/17/2023    Benign prostatic hyperplasia, unspecified whether lower urinary tract symptoms present  -     finasteride (PROSCAR) 5 mg tablet; Take 1 tablet (5 mg total) by mouth once daily.  Dispense: 90 tablet; Refill: 3  -     tamsulosin (FLOMAX) 0.4 mg Cap; Take 1 capsule (0.4 mg total) by mouth once daily.  Dispense: 90 capsule; Refill: 3    Hypothyroidism, unspecified type  -     levothyroxine (SYNTHROID) 100 MCG tablet; 1 tab po qd  Dispense: 90 tablet; Refill: 3    Primary insomnia  -     mirtazapine (REMERON) 7.5 MG Tab; Take 1-2 tablets nightly PRN insomnia  Dispense: 30 tablet; Refill: 0    Senile debility  -     HOSPITAL BED FOR HOME USE    Allergic rhinitis, unspecified seasonality, unspecified trigger  -     azelastine (ASTELIN) 137 mcg (0.1 %) nasal spray; 1 spray (137 mcg total) by Nasal route 2 (two) times daily.  Dispense: 30 mL; Refill: 2    Screening for malignant neoplasm of prostate  -     PSA, Screening; Future; Expected date:  03/17/2023    Paresthesia  -     VITAMIN B12; Future; Expected date: 03/17/2023    Stable continue treatment plan. Follow up 6 months    Time spent: 45 minutes in face to face discussion concerning diagnosis, prognosis, review of lab and test results, benefits of treatment as well as management of disease, counseling of patient and coordination of care between various health care providers . Greater than half the time spent was used for coordination of care and counseling of patient.     No follow-ups on file.

## 2023-03-29 ENCOUNTER — PATIENT MESSAGE (OUTPATIENT)
Dept: FAMILY MEDICINE | Facility: CLINIC | Age: 88
End: 2023-03-29
Payer: MEDICARE

## 2023-03-30 ENCOUNTER — HOSPITAL ENCOUNTER (OUTPATIENT)
Facility: HOSPITAL | Age: 88
Discharge: HOME HEALTH | End: 2023-03-31
Attending: EMERGENCY MEDICINE | Admitting: FAMILY MEDICINE
Payer: MEDICARE

## 2023-03-30 DIAGNOSIS — R00.1 BRADYCARDIA: ICD-10-CM

## 2023-03-30 DIAGNOSIS — R29.810 FACIAL DROOP: ICD-10-CM

## 2023-03-30 DIAGNOSIS — I63.9 STROKE: ICD-10-CM

## 2023-03-30 DIAGNOSIS — I10 CHRONIC HYPERTENSION: ICD-10-CM

## 2023-03-30 DIAGNOSIS — R47.1 DYSARTHRIA: Primary | ICD-10-CM

## 2023-03-30 DIAGNOSIS — I63.9 CVA (CEREBRAL VASCULAR ACCIDENT): ICD-10-CM

## 2023-03-30 LAB
ALBUMIN SERPL BCP-MCNC: 3.1 G/DL (ref 3.5–5.2)
ALP SERPL-CCNC: 62 U/L (ref 55–135)
ALT SERPL W/O P-5'-P-CCNC: 18 U/L (ref 10–44)
ANION GAP SERPL CALC-SCNC: 8 MMOL/L (ref 8–16)
AORTIC ROOT ANNULUS: 3.7 CM
APTT PPP: 26.7 SEC (ref 21–32)
ASCENDING AORTA: 3.27 CM
AST SERPL-CCNC: 18 U/L (ref 10–40)
AV INDEX (PROSTH): 0.84
AV MEAN GRADIENT: 3 MMHG
AV PEAK GRADIENT: 5 MMHG
AV REGURGITATION PRESSURE HALF TIME: 839.82 MS
AV VALVE AREA: 2.32 CM2
AV VELOCITY RATIO: 0.84
BASOPHILS # BLD AUTO: 0.02 K/UL (ref 0–0.2)
BASOPHILS NFR BLD: 0.3 % (ref 0–1.9)
BILIRUB SERPL-MCNC: 1.4 MG/DL (ref 0.1–1)
BSA FOR ECHO PROCEDURE: 1.88 M2
BUN SERPL-MCNC: 16 MG/DL (ref 10–30)
CALCIUM SERPL-MCNC: 8.8 MG/DL (ref 8.7–10.5)
CHLORIDE SERPL-SCNC: 103 MMOL/L (ref 95–110)
CHOLEST SERPL-MCNC: 172 MG/DL (ref 120–199)
CHOLEST/HDLC SERPL: 3.7 {RATIO} (ref 2–5)
CO2 SERPL-SCNC: 27 MMOL/L (ref 23–29)
CREAT SERPL-MCNC: 0.7 MG/DL (ref 0.5–1.4)
CV ECHO LV RWT: 1.06 CM
DIFFERENTIAL METHOD: ABNORMAL
DOP CALC AO PEAK VEL: 1.12 M/S
DOP CALC AO VTI: 29.6 CM
DOP CALC LVOT AREA: 2.7 CM2
DOP CALC LVOT DIAMETER: 1.87 CM
DOP CALC LVOT PEAK VEL: 0.94 M/S
DOP CALC LVOT STROKE VOLUME: 68.63 CM3
DOP CALC RVOT PEAK VEL: 0.62 M/S
DOP CALC RVOT VTI: 16 CM
DOP CALCLVOT PEAK VEL VTI: 25 CM
E WAVE DECELERATION TIME: 406.42 MSEC
E/A RATIO: 0.64
E/E' RATIO: 7.88 M/S
ECHO LV POSTERIOR WALL: 1.34 CM (ref 0.6–1.1)
EJECTION FRACTION: 60 %
EOSINOPHIL # BLD AUTO: 0.1 K/UL (ref 0–0.5)
EOSINOPHIL NFR BLD: 1.7 % (ref 0–8)
ERYTHROCYTE [DISTWIDTH] IN BLOOD BY AUTOMATED COUNT: 13.1 % (ref 11.5–14.5)
EST. GFR  (NO RACE VARIABLE): >60 ML/MIN/1.73 M^2
FRACTIONAL SHORTENING: 29 % (ref 28–44)
GLUCOSE SERPL-MCNC: 102 MG/DL (ref 70–110)
HCT VFR BLD AUTO: 40.5 % (ref 40–54)
HDLC SERPL-MCNC: 47 MG/DL (ref 40–75)
HDLC SERPL: 27.3 % (ref 20–50)
HGB BLD-MCNC: 13.1 G/DL (ref 14–18)
IMM GRANULOCYTES # BLD AUTO: 0.03 K/UL (ref 0–0.04)
IMM GRANULOCYTES NFR BLD AUTO: 0.4 % (ref 0–0.5)
INR PPP: 1 (ref 0.8–1.2)
INTERVENTRICULAR SEPTUM: 1.48 CM (ref 0.6–1.1)
IVRT: 83.73 MSEC
LA MAJOR: 4.4 CM
LA MINOR: 3.2 CM
LA WIDTH: 3.4 CM
LDLC SERPL CALC-MCNC: 112.8 MG/DL (ref 63–159)
LEFT ATRIUM SIZE: 3.07 CM
LEFT ATRIUM VOLUME INDEX MOD: 12.3 ML/M2
LEFT ATRIUM VOLUME INDEX: 17.4 ML/M2
LEFT ATRIUM VOLUME MOD: 23.2 CM3
LEFT ATRIUM VOLUME: 32.87 CM3
LEFT INTERNAL DIMENSION IN SYSTOLE: 1.8 CM (ref 2.1–4)
LEFT VENTRICLE DIASTOLIC VOLUME INDEX: 12.14 ML/M2
LEFT VENTRICLE DIASTOLIC VOLUME: 22.95 ML
LEFT VENTRICLE MASS INDEX: 61 G/M2
LEFT VENTRICLE SYSTOLIC VOLUME INDEX: 5.1 ML/M2
LEFT VENTRICLE SYSTOLIC VOLUME: 9.68 ML
LEFT VENTRICULAR INTERNAL DIMENSION IN DIASTOLE: 2.53 CM (ref 3.5–6)
LEFT VENTRICULAR MASS: 114.53 G
LV LATERAL E/E' RATIO: 7.88 M/S
LV SEPTAL E/E' RATIO: 7.88 M/S
LVOT MG: 1.77 MMHG
LVOT MV: 0.62 CM/S
LYMPHOCYTES # BLD AUTO: 1 K/UL (ref 1–4.8)
LYMPHOCYTES NFR BLD: 14.1 % (ref 18–48)
MCH RBC QN AUTO: 29.1 PG (ref 27–31)
MCHC RBC AUTO-ENTMCNC: 32.3 G/DL (ref 32–36)
MCV RBC AUTO: 90 FL (ref 82–98)
MONOCYTES # BLD AUTO: 0.5 K/UL (ref 0.3–1)
MONOCYTES NFR BLD: 6.5 % (ref 4–15)
MV PEAK A VEL: 0.99 M/S
MV PEAK E VEL: 0.63 M/S
MV STENOSIS PRESSURE HALF TIME: 117.86 MS
MV VALVE AREA P 1/2 METHOD: 1.87 CM2
NEUTROPHILS # BLD AUTO: 5.4 K/UL (ref 1.8–7.7)
NEUTROPHILS NFR BLD: 77 % (ref 38–73)
NONHDLC SERPL-MCNC: 125 MG/DL
NRBC BLD-RTO: 0 /100 WBC
PISA AR MAX VEL: 3.64 M/S
PLATELET # BLD AUTO: 144 K/UL (ref 150–450)
PMV BLD AUTO: 11.6 FL (ref 9.2–12.9)
POCT GLUCOSE: 94 MG/DL (ref 70–110)
POTASSIUM SERPL-SCNC: 4.6 MMOL/L (ref 3.5–5.1)
PROT SERPL-MCNC: 7.3 G/DL (ref 6–8.4)
PROTHROMBIN TIME: 10.9 SEC (ref 9–12.5)
PV MEAN GRADIENT: 0.85 MMHG
RA MAJOR: 3.81 CM
RA PRESSURE: 3 MMHG
RBC # BLD AUTO: 4.5 M/UL (ref 4.6–6.2)
SODIUM SERPL-SCNC: 138 MMOL/L (ref 136–145)
STJ: 3.29 CM
TDI LATERAL: 0.08 M/S
TDI SEPTAL: 0.08 M/S
TDI: 0.08 M/S
TRIGL SERPL-MCNC: 61 MG/DL (ref 30–150)
TROPONIN I SERPL DL<=0.01 NG/ML-MCNC: 0.02 NG/ML (ref 0–0.03)
TSH SERPL DL<=0.005 MIU/L-ACNC: 3.13 UIU/ML (ref 0.4–4)
WBC # BLD AUTO: 7.07 K/UL (ref 3.9–12.7)

## 2023-03-30 PROCEDURE — 93010 ELECTROCARDIOGRAM REPORT: CPT | Mod: ,,, | Performed by: INTERNAL MEDICINE

## 2023-03-30 PROCEDURE — 93005 ELECTROCARDIOGRAM TRACING: CPT

## 2023-03-30 PROCEDURE — 84443 ASSAY THYROID STIM HORMONE: CPT | Performed by: EMERGENCY MEDICINE

## 2023-03-30 PROCEDURE — G0378 HOSPITAL OBSERVATION PER HR: HCPCS

## 2023-03-30 PROCEDURE — S5010 5% DEXTROSE AND 0.45% SALINE: HCPCS | Performed by: FAMILY MEDICINE

## 2023-03-30 PROCEDURE — 85730 THROMBOPLASTIN TIME PARTIAL: CPT | Performed by: EMERGENCY MEDICINE

## 2023-03-30 PROCEDURE — 80061 LIPID PANEL: CPT | Performed by: EMERGENCY MEDICINE

## 2023-03-30 PROCEDURE — 84484 ASSAY OF TROPONIN QUANT: CPT | Performed by: EMERGENCY MEDICINE

## 2023-03-30 PROCEDURE — 25000003 PHARM REV CODE 250: Performed by: FAMILY MEDICINE

## 2023-03-30 PROCEDURE — 96374 THER/PROPH/DIAG INJ IV PUSH: CPT | Mod: 59

## 2023-03-30 PROCEDURE — 80053 COMPREHEN METABOLIC PANEL: CPT | Performed by: EMERGENCY MEDICINE

## 2023-03-30 PROCEDURE — 63600175 PHARM REV CODE 636 W HCPCS: Performed by: FAMILY MEDICINE

## 2023-03-30 PROCEDURE — 93010 EKG 12-LEAD: ICD-10-PCS | Mod: ,,, | Performed by: INTERNAL MEDICINE

## 2023-03-30 PROCEDURE — G0426 INPT/ED TELECONSULT50: HCPCS | Mod: 95,,, | Performed by: STUDENT IN AN ORGANIZED HEALTH CARE EDUCATION/TRAINING PROGRAM

## 2023-03-30 PROCEDURE — 99285 EMERGENCY DEPT VISIT HI MDM: CPT | Mod: 25

## 2023-03-30 PROCEDURE — 82962 GLUCOSE BLOOD TEST: CPT

## 2023-03-30 PROCEDURE — 85025 COMPLETE CBC W/AUTO DIFF WBC: CPT | Performed by: EMERGENCY MEDICINE

## 2023-03-30 PROCEDURE — 25500020 PHARM REV CODE 255: Performed by: FAMILY MEDICINE

## 2023-03-30 PROCEDURE — G0426 PR INPT TELEHEALTH CONSULT 50M: ICD-10-PCS | Mod: 95,,, | Performed by: STUDENT IN AN ORGANIZED HEALTH CARE EDUCATION/TRAINING PROGRAM

## 2023-03-30 PROCEDURE — 85610 PROTHROMBIN TIME: CPT | Performed by: EMERGENCY MEDICINE

## 2023-03-30 RX ORDER — HYDRALAZINE HYDROCHLORIDE 20 MG/ML
10 INJECTION INTRAMUSCULAR; INTRAVENOUS EVERY 6 HOURS PRN
Status: DISCONTINUED | OUTPATIENT
Start: 2023-03-30 | End: 2023-03-31 | Stop reason: HOSPADM

## 2023-03-30 RX ORDER — LORAZEPAM 2 MG/ML
0.5 INJECTION INTRAMUSCULAR ONCE
Status: COMPLETED | OUTPATIENT
Start: 2023-03-30 | End: 2023-03-30

## 2023-03-30 RX ORDER — LATANOPROST 50 UG/ML
1 SOLUTION/ DROPS OPHTHALMIC NIGHTLY
Status: DISCONTINUED | OUTPATIENT
Start: 2023-03-30 | End: 2023-03-31 | Stop reason: HOSPADM

## 2023-03-30 RX ORDER — CLOPIDOGREL BISULFATE 75 MG/1
300 TABLET ORAL
Status: ACTIVE | OUTPATIENT
Start: 2023-03-30 | End: 2023-03-30

## 2023-03-30 RX ORDER — ASPIRIN 325 MG
325 TABLET ORAL
Status: ACTIVE | OUTPATIENT
Start: 2023-03-30 | End: 2023-03-30

## 2023-03-30 RX ORDER — BRIMONIDINE TARTRATE 2 MG/ML
1 SOLUTION/ DROPS OPHTHALMIC 2 TIMES DAILY
Status: DISCONTINUED | OUTPATIENT
Start: 2023-03-30 | End: 2023-03-31 | Stop reason: HOSPADM

## 2023-03-30 RX ORDER — DEXTROSE MONOHYDRATE AND SODIUM CHLORIDE 5; .45 G/100ML; G/100ML
INJECTION, SOLUTION INTRAVENOUS CONTINUOUS
Status: DISCONTINUED | OUTPATIENT
Start: 2023-03-30 | End: 2023-03-31 | Stop reason: HOSPADM

## 2023-03-30 RX ORDER — SODIUM CHLORIDE 0.9 % (FLUSH) 0.9 %
10 SYRINGE (ML) INJECTION
Status: DISCONTINUED | OUTPATIENT
Start: 2023-03-30 | End: 2023-03-31 | Stop reason: HOSPADM

## 2023-03-30 RX ORDER — ONDANSETRON 2 MG/ML
4 INJECTION INTRAMUSCULAR; INTRAVENOUS EVERY 6 HOURS PRN
Status: DISCONTINUED | OUTPATIENT
Start: 2023-03-30 | End: 2023-03-31 | Stop reason: HOSPADM

## 2023-03-30 RX ADMIN — IOHEXOL 100 ML: 350 INJECTION, SOLUTION INTRAVENOUS at 04:03

## 2023-03-30 RX ADMIN — BRIMONIDINE TARTRATE 1 DROP: 2 SOLUTION/ DROPS OPHTHALMIC at 12:03

## 2023-03-30 RX ADMIN — LATANOPROST 1 DROP: 50 SOLUTION OPHTHALMIC at 08:03

## 2023-03-30 RX ADMIN — DEXTROSE AND SODIUM CHLORIDE: 5; 450 INJECTION, SOLUTION INTRAVENOUS at 12:03

## 2023-03-30 RX ADMIN — BACITRACIN ZINC, NEOMYCIN SULFATE, AND POLYMYXIN B SULFATE: 400; 3.5; 5 OINTMENT TOPICAL at 08:03

## 2023-03-30 RX ADMIN — LORAZEPAM 0.5 MG: 2 INJECTION INTRAMUSCULAR; INTRAVENOUS at 11:03

## 2023-03-30 NOTE — CONSULTS
"      [facility]  Vascular Neurology  Comprehensive Stroke Center  TeleVascular Neurology Acute Consultation Note      Consult to Telemedicine - Acute Stroke  Consult performed by: Liseth Chavez MD  Consult ordered by: Claudia Wells MD        Consulting Provider: CLAUDIA WELLS  Current Providers  No providers found    Patient Location:  [facility] EMERGENCY DEPARTMENT Emergency Department  Spoke hospital nurse at bedside with patient assisting consultant.     Patient information was obtained from patient, past medical records, and primary team.         Assessment/Plan:       Diagnoses:   Neuro  * Dysarthria and anarthria  89 yo male w/ PMHx of HLD, hypothyroidism, insomnia who presents to [facility] ED s/p being found down in the bathroom in his NH.   States that he was brushing his teeth and "ended up on the floor". Was found face down w/ abrasions noted on his R forehead and R dorsum on hand.   Per NH, he was also noted to have a new RFD and new onset dysarthria.   Symptoms are isolated to the above mentioned deficits and are persistent.     NIHSS 2. No acute abnormalities on CTH.   Due to mild, nondisabling symptoms and traumatic fall w/ abrasions, would defer TNK at this point of time. Due to isolated facial symptoms, cannot r/o the noted facial asymmetry and speech changes are due to trauma.   Discussed w/ Dr. Wells post evaluation.     Recommendations:  MRI brain to evaluate for presence and/or extent of ischemic injury  Permissive HTNsion up to 220/110 until AIS is r/o  If AIS is noted, recommend CTA Head & Neck ASAP to evaluate cervico-cephalic vasculature for high risk culprit vessel disease or large/medium vessel occlusion  Low suspicion for LVO at this time  Lipid profile, A1c, TSH  ECHO w/o bubble if not done in the past 3 months.   SLP eval and Rx  Load aspirin 325 mg & clopidogrel 300 mg x 1 now, followed by daily aspirin 81 mg /  clopidogrel 75 mg x 30 days followed by " monotherapy therafter          STROKE DOCUMENTATION     Acute Stroke Times:   Acute Stroke Times   Last Known Normal Date: 03/30/23  Last Known Normal Time: 0730  Symptom Onset Date: 03/30/23  Symptom Onset Time: 0730  Stroke Team Called Date: 03/30/23  Stroke Team Called Time: 0843  Stroke Team Arrival Date: 03/30/23  Stroke Team Arrival Time: 0847  CT Interpretation Time: 0849  Thrombolytic Therapy Recommended: No    NIH Scale:  1a. Level of Consciousness: 0-->Alert, keenly responsive  1b. LOC Questions: 0-->Answers both questions correctly  1c. LOC Commands: 0-->Performs both tasks correctly  2. Best Gaze: 0-->Normal  3. Visual: 0-->No visual loss  4. Facial Palsy: 1-->Minor paralysis (flattened nasolabial fold, asymmetry on smiling)  5a. Motor Arm, Left: 0-->No drift, limb holds 90 (or 45) degrees for full 10 secs  5b. Motor Arm, Right: 0-->No drift, limb holds 90 (or 45) degrees for full 10 secs  6a. Motor Leg, Left: 0-->No drift, leg holds 30 degree position for full 5 secs  6b. Motor Leg, Right: 0-->No drift, leg holds 30 degree position for full 5 secs  7. Limb Ataxia: 0-->Absent  8. Sensory: 0-->Normal, no sensory loss  9. Best Language: 0-->No aphasia, normal  10. Dysarthria: 1-->Mild-to-moderate dysarthria, patient slurs at least some words and, at worst, can be understood with some difficulty  11. Extinction and Inattention (formerly Neglect): 0-->No abnormality  Total (NIH Stroke Scale): 2     Modified Marcos Score: 2  Dillon Coma Scale:    ABCD2 Score:    [id number] Score:   HAS -BLED Score:   ICH Score:   Hunt & Posada Classification:       Blood pressure (!) 144/70, pulse (!) 46, temperature 97.5 °F (36.4 °C), temperature source Oral, resp. rate 16, weight 71.8 kg (158 lb 3.2 oz), SpO2 98 %.  Eligible for thrombolytic therapy?: Yes  Thrombolytic therapy recomended: Thrombolytic therapy not recommended due to Mild Non-Disabling Symptoms and traumatic fall  Possible Interventional Revascularization  "Candidate? No; at this time symptoms not suggestive of large vessel occlusion    Disposition Recommendation: pending further studies  admit to inpatient    Subjective:     History of Present Illness:  89 yo male w/ PMHx of HLD, hypothyroidism, insomnia who [facility] s/p being found down in the bathroom in his NH.   States that he was brushing his teeth and "ended up on the floor". Was found face down w/ abrasions noted on his R forehead and R dorsum on hand.   Per NH, he was also noted to have a new RFD and new onset dysarthria.   Symptoms are isolated to the above mentioned deficits and are persistent.       Woke up with symptoms?: no    Recent bleeding noted: no  Does the patient take any Blood Thinners? no  Medications: No relevant medications      Past Medical History: diabetes, hyperlipidemia and stroke    Past Surgical History: no major surgeries within the last 2 weeks    Family History: no relevant history    Social History: no smoking, no drinking, no drugs    Allergies: No Known Allergies No relevant allergies    Review of Systems   Constitutional: Positive for activity change. Negative for fever.   HENT: Positive for voice change.    Eyes: Negative.    Respiratory: Negative.    Cardiovascular: Negative.    Gastrointestinal: Negative for nausea and vomiting.   Endocrine: Negative.    Genitourinary: Negative.    Musculoskeletal: Negative for gait problem.   Skin: Positive for wound.        Abrasion overlying the R forehead and R dorsum of hand   Neurological: Positive for facial asymmetry and speech difficulty.   Hematological: Negative.    Psychiatric/Behavioral: Negative.      Objective:   Vitals: Blood pressure (!) 144/70, pulse (!) 46, resp. rate 16, SpO2 98 %.     CT READ: Yes  No hemmorhage. No mass effect. No early infarct signs.     Physical Exam  Vitals and nursing note reviewed.   Constitutional:       Comments: Thin, elderly male   HENT:      Head: Atraumatic.      Comments: RFD " noted  Eyes:      Extraocular Movements: Extraocular movements intact.      Comments: R ptosis noted    Cardiovascular:      Rate and Rhythm: Bradycardia present.   Pulmonary:      Effort: Pulmonary effort is normal.   Abdominal:      General: Abdomen is flat.   Musculoskeletal:         General: Normal range of motion.      Cervical back: Normal range of motion.   Skin:     Findings: Bruising present.   Neurological:      Mental Status: He is alert and oriented to person, place, and time.      Cranial Nerves: Cranial nerve deficit present.      Sensory: No sensory deficit.      Motor: No weakness.   Psychiatric:         Mood and Affect: Mood normal.   Patient w/ significant visual deficits at baseline, but able to identify pen on palpation, does not have his glasses available at this time.           Recommended the emergency room physician to have a brief discussion with the patient and/or family if available regarding the  risks and benefits of treatment, and to briefly document the occurrence of that discussion in his clinical encounter note.     The attending portion of this evaluation, treatment, and documentation was performed per Liseth Chavez MD via audiovisual.    Billing code:  (non-intervention mild to moderate stroke, TIA, some mimics)      This patient has a critical neurological condition/illness, with some potential for high morbidity and mortality.  There is a moderate probability for acute neurological change leading to clinical and possibly life-threatening deterioration requiring highest level of physician preparedness for urgent intervention.  Care was coordinated with other physicians involved in the patient's care.  Radiologic studies and laboratory data were reviewed and interpreted, and plan of care was re-assessed based on the results.  Diagnosis, treatment options and prognosis may have been discussed with the patient and/or family members or caregiver.    In your opinion, this was a:  Tier 2 Van Negative    Consult End Time: 9:22 AM     Liseth Chavez MD  Comprehensive Stroke Center  Vascular Neurology   [facility]

## 2023-03-30 NOTE — PT/OT/SLP PROGRESS
Occupational Therapy      Patient Name:  Kwame Vásquez Jr.   MRN:  [id number]    OT eval initiated via chart review and attempted at 11:35 AM. Patient not seen today secondary to Testing/imaging (xray/CT/MRI). Will follow-up at a later time.    3/30/2023  Komal Burnham OT   0250

## 2023-03-30 NOTE — PHARMACY MED REC
"Admission Medication History     The home medication history was taken by Rubin Tirado.    You may go to "Admission" then "Reconcile Home Medications" tabs to review and/or act upon these items.     The home medication list has been updated by the Pharmacy department.   Please read ALL comments highlighted in yellow.   Please address this information as you see fit.    Feel free to contact us if you have any questions or require assistance.      Medications listed below were obtained from: [employer] software- [employer] and Medical records  (Not in a hospital admission)      Rubin Tirado  [id number]    Current Outpatient Medications on File Prior to Encounter   Medication Sig Dispense Refill Last Dose    azelastine (ASTELIN) 137 mcg (0.1 %) nasal spray 1 spray (137 mcg total) by Nasal route 2 (two) times daily. 30 mL 2 3/29/2023    brimonidine 0.2% (ALPHAGAN) 0.2 % Drop Place 1 drop into the left eye 2 (two) times daily.   4 3/29/2023    finasteride (PROSCAR) 5 mg tablet Take 1 tablet (5 mg total) by mouth once daily. 90 tablet 3 3/29/2023    latanoprost 0.005 % ophthalmic solution Place 1 drop into the left eye every evening.    3/29/2023    levothyroxine (SYNTHROID) 100 MCG tablet 1 tab po qd 90 tablet 3 3/29/2023    tamsulosin (FLOMAX) 0.4 mg Cap Take 1 capsule (0.4 mg total) by mouth once daily. 90 capsule 3 3/29/2023    timolol maleate 0.5% (TIMOPTIC) 0.5 % Drop Place 1 drop into the left eye 2 (two) times daily.  4 3/29/2023    propylene glycoL 0.6 % Drop Place 1 drop into the left eye 2 (two) times daily.       senna (SENOKOT) 8.6 mg tablet Take 1 tablet by mouth once daily.                              .        "

## 2023-03-30 NOTE — PLAN OF CARE
[facility] - Med Surg  Initial Discharge Assessment       Primary Care Provider: Pooja Jimenez MD    Admission Diagnosis: Bradycardia [R00.1]  Facial droop [R29.810]  CVA (cerebral vascular accident) [I63.9]  Stroke [I63.9]  Dysarthria [R47.1]  Chronic hypertension [I10]    Admission Date: 3/30/2023  Expected Discharge Date: Per Attending     Discharge Barriers Identified: None    Payor: MEDICARE / Plan: MEDICARE PART A & B / Product Type: Government /     Extended Emergency Contact Information  Primary Emergency Contact: Chanel De Anda  Mobile Phone: [phone]  Relation: Grandchild  Secondary Emergency Contact: MarielaSeda  Mobile Phone: [phone]  Relation: Relative   needed? No    Discharge Plan A: Home         [facility] - [place], [place] - [address]   [address] [facility] [address]  Phone: [phone] Fax: [phone]      Initial Assessment (most recent)       Adult Discharge Assessment - 03/30/23 1531          Discharge Assessment    Assessment Type Discharge Planning Assessment     Confirmed/corrected address, phone number and insurance Yes     Confirmed Demographics Correct on Facesheet   mailing address on facesheet. Pt is a resident at [facility] in [place]    Source of Information family;patient     When was your last doctors appointment? --   2 weeks ago    Communicated ALIDA with patient/caregiver Date not available/Unable to determine     Reason For Admission Dysarthria and anarthria     People in Home alone     Facility Arrived From: [facility]     Do you expect to return to your current living situation? Yes     Do you have help at home or someone to help you manage your care at home? Yes     Who are your caregiver(s) and their phone number(s)? 24/7 caregiver     Prior to hospitilization cognitive status: Alert/Oriented     Current cognitive status: Alert/Oriented     Equipment Currently Used at Home  wheelchair     Do you currently have service(s) that help you manage your care at home? No     Do you take prescription medications? Yes     Do you have prescription coverage? Yes     Coverage Medicare     Do you have any problems affording any of your prescribed medications? No     Is the patient taking medications as prescribed? yes     Who is going to help you get home at discharge? Pt's caregiver     How do you get to doctors appointments? family or friend will provide     Are you on dialysis? No     Do you take coumadin? No     Discharge Plan A Home     DME Needed Upon Discharge  hospital bed;suction machine;oxygen     Discharge Plan discussed with: Caregiver   pt's Kevin tijerina [phone]    Discharge Barriers Identified None                   Pt's caregiver requesting hospital bed, suction machine, and home O2 for d/c.     Pt recently with [facility]; recently discharged after 2 years. Pt has HME at home from [employer].     Assisted Living at [facility] in [place]; plan to return upon d/c.     Pt has a 24/7 caregiver.

## 2023-03-30 NOTE — ED PROVIDER NOTES
SCRIBE #1 NOTE: IDaniela, am scribing for, and in the presence of, Claudia Randhawa MD. I have scribed the entire note.      History      Chief Complaint   Patient presents with    Facial Droop     Pt sent from nursing home with right facial droop.  Pt found on floor, LKN 0730       Review of patient's allergies indicates:  No Known Allergies     HPI   HPI    3/30/2023, 8:46 AM   [facility]      History of Present Illness: Kwame Vásquez Jr. is a 89 y.o. male patient with a PMHx of hypothyroidism who presents to the Emergency Department from his assisted living facility for a R-sided facial droop and slurred speech. Pt woke up normal this morning and was last seen normal by his caretaker at 0730 this morning. At 0730 this morning, the pt's caretaker left the pt for approximately 10 to 20 minutes. When the pt's caretaker returned, they found the pt face down on the ground after falling face first out of his wheelchair. It is unknown if the pt lost consciousness.  When the pt was picked up after his fall, he was noted to have a R-sided facial droop and slurred speech which is abnormal for the pt. Symptoms are constant and moderate in severity. No mitigating or exacerbating factors reported. Associated sxs include wounds to the forehead and dorsal aspect of R hand. Patient denies any neck pain, back pain, numbness, weakness, N/V/D, and all other sxs at this time. No prior Tx reported. No further complaints or concerns at this time.          Arrival mode: [facility]    PCP: Pooja Jimenez MD       Past Medical History:  Past Medical History:   Diagnosis Date    Glaucoma     Hypothyroidism        Past Surgical History:  Past Surgical History:   Procedure Laterality Date    CHOLECYSTECTOMY      Late 80's    ENUCLEATION Right     TONSILLECTOMY           Family History:  Family History   Problem Relation Age of Onset    No Known Problems Mother     No Known Problems Father         Social History:  Social History     Tobacco Use    Smoking status: Never    Smokeless tobacco: Never   Substance and Sexual Activity    Alcohol use: Never    Drug use: Never    Sexual activity: Not on file       ROS   Review of Systems   Constitutional:  Negative for fever.   HENT:  Negative for sore throat.    Respiratory:  Negative for shortness of breath.    Cardiovascular:  Negative for chest pain.   Gastrointestinal:  Negative for diarrhea, nausea and vomiting.   Genitourinary:  Negative for dysuria.   Musculoskeletal:  Negative for back pain and neck pain.   Skin:  Positive for wound (to forehead and dorsal aspect of the R hand). Negative for rash.   Neurological:  Positive for facial asymmetry (R-sided droop) and speech difficulty (slurred). Negative for weakness and numbness.   Hematological:  Does not bruise/bleed easily.   All other systems reviewed and are negative.    Physical Exam      Initial Vitals   BP Pulse Resp Temp SpO2   03/30/23 0854 03/30/23 0854 03/30/23 0854 03/30/23 0911 03/30/23 0854   (!) 144/70 (!) 46 16 97.5 °F (36.4 °C) 98 %      MAP       --                 Physical Exam  Nursing Notes and Vital Signs Reviewed.  Constitutional: Patient is in no obvious distress. Elderly.  Head: Small contusion to the forehead. Normocephalic.  Eyes: PERRL. EOM intact. Conjunctivae are not pale. No scleral icterus.  ENT: Mucous membranes are moist. Oropharynx is clear and symmetric.    Neck: Supple. Full ROM. No lymphadenopathy.  Cardiovascular: Regular rate. Regular rhythm. No murmurs, rubs, or gallops. Distal pulses are 2+ and symmetric.  Pulmonary/Chest: No respiratory distress. Clear to auscultation bilaterally. No wheezing or rales.  Abdominal: Soft and non-distended.  There is no tenderness.  No rebound, guarding, or rigidity.   Musculoskeletal: Moves all extremities. No obvious deformities. No edema.  Skin: There is a skin tear to the distal aspect of the R hand. No obvious soft tissue  swelling or deformity.   Neurological: Patient is alert and oriented to person, place and time. R facial droop. Follows commands appropriately. Equal  strength. There is no pronator drift of outstretched arms. Hard of hearing. Speech is clear and normal.   Psychiatric: Normal affect. Good eye contact. Appropriate in content.    ED Course    Critical Care    Date/Time: 3/30/2023 10:30 AM  Performed by: Claudia Randhawa MD  Authorized by: Linda Pitt MD   Direct patient critical care time: 25 minutes  Additional history critical care time: 8 minutes  Ordering / reviewing critical care time: 8 minutes  Documentation critical care time: 6 minutes  Consulting other physicians critical care time: 5 minutes  Total critical care time (exclusive of procedural time) : 52 minutes  Critical care was necessary to treat or prevent imminent or life-threatening deterioration of the following conditions: CNS failure or compromise.  Critical care was time spent personally by me on the following activities: blood draw for specimens, development of treatment plan with patient or surrogate, discussions with consultants, interpretation of cardiac output measurements, evaluation of patient's response to treatment, examination of patient, discussions with primary provider, obtaining history from patient or surrogate, ordering and performing treatments and interventions, ordering and review of laboratory studies, ordering and review of radiographic studies, pulse oximetry, re-evaluation of patient's condition and review of old charts.      ED Vital Signs:  Vitals:    03/30/23 2123 03/31/23 0033 03/31/23 0135 03/31/23 0315   BP:  (!) 193/70     Pulse: (!) 48 61 (!) 56 (!) 58   Resp: 18 18     Temp:  98.2 °F (36.8 °C)     TempSrc:  Oral     SpO2: 96% 96%     Weight:       Height:        03/31/23 0417 03/31/23 0545 03/31/23 0727 03/31/23 0734   BP: (!) 194/79   (!) 142/65   Pulse: (!) 55 (!) 55 62 (!) 58   Resp: 16  18 20   Temp:  98.2 °F (36.8 °C)   98.3 °F (36.8 °C)   TempSrc: Axillary   Oral   SpO2: 96%  98% 96%   Weight: 69 kg (152 lb 1.9 oz)      Height:        03/31/23 0915 03/31/23 1115 03/31/23 1220 03/31/23 1315   BP:   (!) 145/65    Pulse: (!) 58 60 61 60   Resp:   18    Temp:   98 °F (36.7 °C)    TempSrc:   Oral    SpO2:   96%    Weight:       Height:        03/31/23 1447 03/31/23 1515 03/31/23 1546   BP:   (!) 164/75   Pulse: 60 (!) 58 (!) 58   Resp: 18  18   Temp:   98 °F (36.7 °C)   TempSrc:   Oral   SpO2: 97%  96%   Weight:      Height:          Abnormal Lab Results:  Labs Reviewed   CBC W/ AUTO DIFFERENTIAL - Abnormal; Notable for the following components:       Result Value    RBC 4.50 (*)     Hemoglobin 13.1 (*)     Platelets 144 (*)     Gran % 77.0 (*)     Lymph % 14.1 (*)     All other components within normal limits   COMPREHENSIVE METABOLIC PANEL - Abnormal; Notable for the following components:    Albumin 3.1 (*)     Total Bilirubin 1.4 (*)     All other components within normal limits   PROTIME-INR   TSH   APTT   TROPONIN I   URINALYSIS, REFLEX TO URINE CULTURE   POCT GLUCOSE        All Lab Results:  Results for orders placed or performed during the hospital encounter of 03/30/23   CBC W/ AUTO DIFFERENTIAL   Result Value Ref Range    WBC 7.07 3.90 - 12.70 K/uL    RBC 4.50 (L) 4.60 - 6.20 M/uL    Hemoglobin 13.1 (L) 14.0 - 18.0 g/dL    Hematocrit 40.5 40.0 - 54.0 %    MCV 90 82 - 98 fL    MCH 29.1 27.0 - 31.0 pg    MCHC 32.3 32.0 - 36.0 g/dL    RDW 13.1 11.5 - 14.5 %    Platelets 144 (L) 150 - 450 K/uL    MPV 11.6 9.2 - 12.9 fL    Immature Granulocytes 0.4 0.0 - 0.5 %    Gran # (ANC) 5.4 1.8 - 7.7 K/uL    Immature Grans (Abs) 0.03 0.00 - 0.04 K/uL    Lymph # 1.0 1.0 - 4.8 K/uL    Mono # 0.5 0.3 - 1.0 K/uL    Eos # 0.1 0.0 - 0.5 K/uL    Baso # 0.02 0.00 - 0.20 K/uL    nRBC 0 0 /100 WBC    Gran % 77.0 (H) 38.0 - 73.0 %    Lymph % 14.1 (L) 18.0 - 48.0 %    Mono % 6.5 4.0 - 15.0 %    Eosinophil % 1.7 0.0 - 8.0 %    Basophil  % 0.3 0.0 - 1.9 %    Differential Method Automated    Comprehensive metabolic panel   Result Value Ref Range    Sodium 138 136 - 145 mmol/L    Potassium 4.6 3.5 - 5.1 mmol/L    Chloride 103 95 - 110 mmol/L    CO2 27 23 - 29 mmol/L    Glucose 102 70 - 110 mg/dL    BUN 16 10 - 30 mg/dL    Creatinine 0.7 0.5 - 1.4 mg/dL    Calcium 8.8 8.7 - 10.5 mg/dL    Total Protein 7.3 6.0 - 8.4 g/dL    Albumin 3.1 (L) 3.5 - 5.2 g/dL    Total Bilirubin 1.4 (H) 0.1 - 1.0 mg/dL    Alkaline Phosphatase 62 55 - 135 U/L    AST 18 10 - 40 U/L    ALT 18 10 - 44 U/L    Anion Gap 8 8 - 16 mmol/L    eGFR >60 >60 mL/min/1.73 m^2   Protime-INR   Result Value Ref Range    Prothrombin Time 10.9 9.0 - 12.5 sec    INR 1.0 0.8 - 1.2   TSH   Result Value Ref Range    TSH 3.131 0.400 - 4.000 uIU/mL   LDL - Lipid Panel   Result Value Ref Range    Cholesterol 172 120 - 199 mg/dL    Triglycerides 61 30 - 150 mg/dL    HDL 47 40 - 75 mg/dL    LDL Cholesterol 112.8 63.0 - 159.0 mg/dL    HDL/Cholesterol Ratio 27.3 20.0 - 50.0 %    Total Cholesterol/HDL Ratio 3.7 2.0 - 5.0    Non-HDL Cholesterol 125 mg/dL   APTT   Result Value Ref Range    aPTT 26.7 21.0 - 32.0 sec   Troponin I   Result Value Ref Range    Troponin I 0.018 0.000 - 0.026 ng/mL   Echo Saline Bubble? Yes   Result Value Ref Range    BSA 1.88 m2    TDI SEPTAL 0.08 m/s    LV LATERAL E/E' RATIO 7.88 m/s    LV SEPTAL E/E' RATIO 7.88 m/s    LA WIDTH 3.40 cm    Left Ventricular Outflow Tract Mean Velocity 0.62 cm/s    Left Ventricular Outflow Tract Mean Gradient 1.77 mmHg    TDI LATERAL 0.08 m/s    LVIDd 2.53 (A) 3.5 - 6.0 cm    IVS 1.48 (A) 0.6 - 1.1 cm    Posterior Wall 1.34 (A) 0.6 - 1.1 cm    Ao root annulus 3.70 cm    LVIDs 1.80 (A) 2.1 - 4.0 cm    FS 29 28 - 44 %    LA volume 32.87 cm3    STJ 3.29 cm    Ascending aorta 3.27 cm    LV mass 114.53 g    LA size 3.07 cm    Left Ventricle Relative Wall Thickness 1.06 cm    AV regurgitation pressure 1/2 time [id number] ms    AV mean gradient 3  mmHg    AV valve area 2.32 cm2    AV Velocity Ratio 0.84     AV index (prosthetic) 0.84     MV valve area p 1/2 method 1.87 cm2    E/A ratio 0.64     Mean e' 0.08 m/s    E wave deceleration time 406.42 msec    IVRT 83.73 msec    LVOT diameter 1.87 cm    LVOT area 2.7 cm2    LVOT peak eddie 0.94 m/s    LVOT peak VTI 25.00 cm    Ao peak eddie 1.12 m/s    Ao VTI 29.6 cm    RVOT peak eddie 0.62 m/s    RVOT peak VTI 16.0 cm    LVOT stroke volume 68.63 cm3    AV peak gradient 5 mmHg    PV mean gradient 0.85 mmHg    E/E' ratio 7.88 m/s    MV Peak E Eddie 0.63 m/s    AR Max Eddie 3.64 m/s    MV stenosis pressure 1/2 time 117.86 ms    MV Peak A Eddie 0.99 m/s    LV Systolic Volume 9.68 mL    LV Systolic Volume Index 5.1 mL/m2    LV Diastolic Volume 22.95 mL    LV Diastolic Volume Index 12.14 mL/m2    LA Volume Index 17.4 mL/m2    LV Mass Index 61 g/m2    RA Major Axis 3.81 cm    Left Atrium Minor Axis 3.20 cm    Left Atrium Major Axis 4.40 cm    LA Volume Index (Mod) 12.3 mL/m2    LA volume (mod) 23.20 cm3    Right Atrial Pressure (from IVC) 3 mmHg    EF 60 %   POCT glucose   Result Value Ref Range    POCT Glucose 94 70 - 110 mg/dL         Imaging Results:  Imaging Results              MRI Brain Without Contrast (Final result)  Result time 03/30/23 12:10:29      Final result by Live Rhodes Jr., MD (03/30/23 12:10:29)                   Impression:      1. No acute infarction or acute intracranial hemorrhage.  2. Soft tissue contusion about the right frontal scalp.  3. Mild T2 alteration of the periventricular white matter likely relating to chronic microangiopathic ischemia.  4. Ventriculomegaly with moderate cerebral atrophy.  The ventriculomegaly is likely related to central atrophy/ex vacuo dilation.      Electronically signed by: Live Rhodes Jr., MD  Date:    03/30/2023  Time:    12:10               Narrative:    EXAMINATION:  MRI BRAIN WITHOUT CONTRAST    CLINICAL HISTORY:  Transient ischemic attack  (TIA);    TECHNIQUE:  Sagittal T1. Axial T1, T2, T2 FLAIR, GRE, DWI. Coronal T2 FLAIR.    COMPARISON:  CT of the head from 03/30/2023.    FINDINGS:  Soft tissue contusion about the right frontal scalp.  There is no restricted diffusion. Mild patchy T2 FLAIR signal within the periventricular white matter.  Chronic infarct within the left cerebellum.    Moderate generalized atrophy with ventriculomegaly.  Midline structures are normal. There are preserved arterial flow-voids on T2 weighted imaging. The paranasal sinuses and mastoid air cells are clear.    Normal marrow signal.                                       US Carotid Bilateral (Final result)  Result time 03/30/23 11:54:34      Final result by Vincent Hammond MD (03/30/23 11:54:34)                   Impression:      No significant stenosis.    **Categories of stenosis (0-15%, 16-49%, 50-69% and 70-99% ) are based on published criteria that have been internally validated.  Degree of stenosis is based on NASCET criteria and refers to the degree of luminal diameter narrowing as a percentage of the normal ICA distal to the stenosis and any area of post stenotic dilation.      Electronically signed by: Vincent Hammond MD  Date:    03/30/2023  Time:    11:54               Narrative:    EXAMINATION:  US CAROTID BILATERAL    CLINICAL HISTORY:  Cerebral infarction, unspecified    COMPARISON:  None    FINDINGS:  Right common carotid:    Peak systolic velocity 54 cm/sec.    Right internal carotid artery: Calcified plaque is seen in the bulb    Peak systolic velocity: 36 cm/sec.    IC/CC ratio:  0.7.    Left common carotid:    Peak systolic velocity 62 cm/sec.    Left internal carotid artery: Calcified plaque is seen in the bulb    Peak systolic velocity 67 cm/sec.    IC/CC ratio 1.1.    Both vertebral arteries demonstrate antegrade flow.                                       X-Ray Chest AP Portable (Final result)  Result time 03/30/23 09:50:58      Final result by TABITHA Sinha  Hipolito Diaz MD (03/30/23 09:50:58)                   Impression:      1. The lungs are clear.  2. There are surgical clips projected over the right side of the abdomen.  .      Electronically signed by: Kwame Mcmillan MD  Date:    03/30/2023  Time:    09:50               Narrative:    EXAMINATION:  XR CHEST AP PORTABLE    CLINICAL HISTORY:  Stroke;    COMPARISON:  06/03/2019    FINDINGS:  The size of the heart is normal. The lungs are clear. There is no pneumothorax.  The costophrenic angles are sharp.  There are surgical clips projected over the right side of the abdomen.                                       CT Head Without Contrast (Final result)  Result time 03/30/23 08:49:17      Final result by Live Rhodes Jr., MD (03/30/23 08:49:17)                   Impression:      1. No acute intracranial hemorrhage.  2. Aspect score is 10/10.  3. 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  All CT scans at this facility use dose modulation, iterative reconstruction, and/or weight base dosing when appropriate to reduce radiation dose to as low as reasonably achievable.      Electronically signed by: Live Rhodes Jr., MD  Date:    03/30/2023  Time:    08:49               Narrative:    EXAMINATION:  CT HEAD WITHOUT CONTRAST    CLINICAL HISTORY:  Neuro deficit, acute, stroke suspected;    TECHNIQUE:  Contiguous axial CT images were obtained from the skull base through the vertex without intravenous contrast.    COMPARISON:  Prior CT of the head from 03/22/2021.    FINDINGS:  No intracranial hemorrhage. No mass effect or midline shift. 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  Gray-white differentiation appears well preserved within the cerebrum.  No tissue swelling.  Moderate generalized atrophy without signs of hydrocephalus.  Artificial right globe.  The paranasal sinuses and mastoid air  cells are clear.  No concerning osseous findings.                                     The EKG was ordered, reviewed, and independently interpreted by the ED provider.  Interpretation time: 9:08  Rate: 44 BPM  Rhythm: Marked sinus bradycardia with 1st degree AV block  Interpretation: Left axis deviation. RBBB. No STEMI.           The Emergency Provider reviewed the vital signs and test results, which are outlined above.    ED Discussion     9:03 AM: Discussed pt's case with Dr. Chavez (Vascular Neurology) who recommends loading ASA and Plavix and an MRI patient not tPA candidate.     10:13 AM: Discussed case with Dr. Gutierrez (Hospital Medicine). Dr. Gutierrez agrees with current care and management of pt and accepts admission.   Admitting Service: Hospital Medicine  Admitting Physician: Dr. Gutierrez  Admit to: Obs Tele    10:14 AM: Re-evaluated pt. I have discussed test results, shared treatment plan, and the need for admission with patient and family at bedside. Pt and family express understanding at this time and agree with all information. All questions answered. Pt and family have no further questions or concerns at this time. Pt is ready for admit.           ED Medication(s):  Medications   aspirin tablet 325 mg (325 mg Oral Not Given 3/30/23 0915)   clopidogreL tablet 300 mg (300 mg Oral Not Given 3/30/23 0915)   LORazepam injection 0.5 mg (0.5 mg Intravenous Not Given 3/30/23 1215)   iohexoL (OMNIPAQUE 350) injection 100 mL (100 mLs Intravenous Given 3/30/23 1651)        Follow-up Information       Pooja Jimenez MD Follow up in 3 day(s).    Specialty: Family Medicine  Why: -hospital follow up  Contact information:  [address] [facility] [address]  [phone]               Amie Hernandez NP Follow up in 1 month(s).    Specialty: Neurology  Why: -hospital follow up- 4-6 weeks  Contact information:  [address] [place] [address] [id number] [phone]                             Discharge Medication  List as of 3/31/2023  4:40 PM        START taking these medications    Details   aspirin (ECOTRIN) 81 MG EC tablet Take 1 tablet (81 mg total) by mouth once daily., Starting Fri 3/31/2023, Until Sun 4/30/2023, Normal      atorvastatin (LIPITOR) 20 MG tablet Take 1 tablet (20 mg total) by mouth once daily., Starting Fri 3/31/2023, Until Sun 4/30/2023, Normal      neomycin-bacitracin-polymyxin (NEOSPORIN) ointment Apply topically 3 (three) times daily. for 10 days, Starting Fri 3/31/2023, Until Mon 4/10/2023, Normal               Medical Decision Making    Medical Decision Making:   Clinical Tests:   Lab Tests: Ordered and Reviewed  Radiological Study: Ordered and Reviewed  Medical Tests: Ordered and Reviewed  ED Management:  Patient with stroke like symptoms, CT head negative, tele stroke performed not tPA candidate but recommended admit for stroke workup, failed dsyphagia screen, lab work and ECG unremarkable, hospital medicine consulted for admission.          Scribe Attestation:   Scribe #1: I performed the above scribed service and the documentation accurately describes the services I performed. I attest to the accuracy of the note.    Attending:   Physician Attestation Statement for Scribe #1: I, Claudia Randhawa MD, personally performed the services described in this documentation, as scribed by Daniela Irizarry, in my presence, and it is both accurate and complete.          Clinical Impression       ICD-10-CM ICD-9-CM   1. Dysarthria  R47.1 784.51   2. Stroke  I63.9 434.91   3. CVA (cerebral vascular accident)  I63.9 434.91   4. Facial droop  R29.810 781.94   5. Chronic hypertension  I10 401.9   6. Bradycardia  R00.1 427.89       Disposition:   Disposition: Placed in Observation  Condition: Fair       Claudia Randhawa MD  03/30/23 1428       Claudia Randhawa MD  04/20/23 1031

## 2023-03-30 NOTE — SUBJECTIVE & OBJECTIVE
Woke up with symptoms?: no    Recent bleeding noted: no  Does the patient take any Blood Thinners? no  Medications: No relevant medications      Past Medical History: diabetes, hyperlipidemia and stroke    Past Surgical History: no major surgeries within the last 2 weeks    Family History: no relevant history    Social History: no smoking, no drinking, no drugs    Allergies: No Known Allergies No relevant allergies    Review of Systems   Constitutional: Positive for activity change. Negative for fever.   HENT: Positive for voice change.    Eyes: Negative.    Respiratory: Negative.    Cardiovascular: Negative.    Gastrointestinal: Negative for nausea and vomiting.   Endocrine: Negative.    Genitourinary: Negative.    Musculoskeletal: Negative for gait problem.   Skin: Positive for wound.        Abrasion overlying the R forehead and R dorsum of hand   Neurological: Positive for facial asymmetry and speech difficulty.   Hematological: Negative.    Psychiatric/Behavioral: Negative.      Objective:   Vitals: Blood pressure (!) 144/70, pulse (!) 46, resp. rate 16, SpO2 98 %.     CT READ: Yes  No hemmorhage. No mass effect. No early infarct signs.     Physical Exam  Vitals and nursing note reviewed.   Constitutional:       Comments: Thin, elderly male   HENT:      Head: Atraumatic.      Comments: RFD noted  Eyes:      Extraocular Movements: Extraocular movements intact.      Comments: R ptosis noted    Cardiovascular:      Rate and Rhythm: Bradycardia present.   Pulmonary:      Effort: Pulmonary effort is normal.   Abdominal:      General: Abdomen is flat.   Musculoskeletal:         General: Normal range of motion.      Cervical back: Normal range of motion.   Skin:     Findings: Bruising present.   Neurological:      Mental Status: He is alert and oriented to person, place, and time.      Cranial Nerves: Cranial nerve deficit present.      Sensory: No sensory deficit.      Motor: No weakness.   Psychiatric:         Mood  and Affect: Mood normal.

## 2023-03-30 NOTE — CONSULTS
See Consult note 3/30 9:19am and progress note 2:57pm.    Amie Hernandez, NP-C  Vascular Neurology  [phone]

## 2023-03-30 NOTE — ASSESSMENT & PLAN NOTE
"89 yo male w/ PMHx of HLD, hypothyroidism, insomnia who presents to [facility] ED s/p being found down in the bathroom in his NH.   States that he was brushing his teeth and "ended up on the floor". Was found face down w/ abrasions noted on his R forehead and R dorsum on hand.   Per NH, he was also noted to have a new RFD and new onset dysarthria.   Symptoms are isolated to the above mentioned deficits and are persistent.     NIHSS 2. No acute abnormalities on CTH.   Due to mild, nondisabling symptoms and traumatic fall w/ abrasions, would defer TNK at this point of time. Due to isolated facial symptoms, cannot r/o the noted facial asymmetry and speech changes are due to trauma.   Discussed w/ Dr. Randhawa post evaluation.     Recommendations:  · MRI brain to evaluate for presence and/or extent of ischemic injury  · Permissive HTNsion up to 220/110 until AIS is r/o  · If AIS is noted, recommend CTA Head & Neck ASAP to evaluate cervico-cephalic vasculature for high risk culprit vessel disease or large/medium vessel occlusion  · Low suspicion for LVO at this time  · Lipid profile, A1c, TSH  · ECHO w/o bubble if not done in the past 3 months.   · SLP eval and Rx  · Load aspirin 325 mg & clopidogrel 300 mg x 1 now, followed by daily aspirin 81 mg /  clopidogrel 75 mg x 30 days followed by monotherapy therafter    "

## 2023-03-30 NOTE — HPI
Patient is a 89 y.o.  male with a PMHx of hypothyroidism and tia who presents to the Emergency Department from his assisted living facility for a R-sided facial droop and slurred speech. Patient hard of hearing therefore hx provided by caregiver. Per report, pt last seen normal at 730 am. Caregiver left the pt for 10-20 minutes and came back to him falling face first out of his wheelchair. Right sided facial droop was then reported after picking the pt up. He had similar symptoms last year and was told he had a TIA. No other issues reported at this time.     In the ed, labs unremarkable. 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 HM consulted and patient placed in obs.

## 2023-03-30 NOTE — SUBJECTIVE & OBJECTIVE
Past Medical History:   Diagnosis Date    Glaucoma     Hypothyroidism        Past Surgical History:   Procedure Laterality Date    CHOLECYSTECTOMY      Late 80's    ENUCLEATION Right     TONSILLECTOMY         Review of patient's allergies indicates:  No Known Allergies    No current facility-administered medications on file prior to encounter.     Current Outpatient Medications on File Prior to Encounter   Medication Sig    azelastine (ASTELIN) 137 mcg (0.1 %) nasal spray 1 spray (137 mcg total) by Nasal route 2 (two) times daily.    brimonidine 0.2% (ALPHAGAN) 0.2 % Drop Place 1 drop into the left eye 2 (two) times daily.     finasteride (PROSCAR) 5 mg tablet Take 1 tablet (5 mg total) by mouth once daily.    latanoprost 0.005 % ophthalmic solution Place 1 drop into the left eye every evening.     levothyroxine (SYNTHROID) 100 MCG tablet 1 tab po qd    tamsulosin (FLOMAX) 0.4 mg Cap Take 1 capsule (0.4 mg total) by mouth once daily.    timolol maleate 0.5% (TIMOPTIC) 0.5 % Drop Place 1 drop into the left eye 2 (two) times daily.    brimonidine-timoloL (COMBIGAN) 0.2-0.5 % Drop Place 1 drop into the left eye 2 (two) times daily.     CYANOCOBALAMIN, VITAMIN B-12, ORAL Take by mouth.    polymyxin B sulf-trimethoprim (POLYTRIM) 10,000 unit- 1 mg/mL Drop Place 1 drop into the right eye 3 (three) times daily.    propylene glycoL 0.6 % Drop Place 1 drop into the left eye 2 (two) times daily.    senna (SENOKOT) 8.6 mg tablet Take 1 tablet by mouth once daily.    [DISCONTINUED] mirtazapine (REMERON) 7.5 MG Tab Take 1-2 tablets nightly PRN insomnia    [DISCONTINUED] ofloxacin (OCUFLOX) 0.3 % ophthalmic solution PLACE ONE DROP IN EACH EYE FOUR TIMES DAILY (Patient not taking: Reported on 3/17/2023)     Family History       Problem Relation (Age of Onset)    No Known Problems Mother, Father          Tobacco Use    Smoking status: Never    Smokeless tobacco: Never   Substance and Sexual Activity    Alcohol use: Never    Drug  use: Never    Sexual activity: Not on file     Review of Systems   Constitutional:  Negative for fatigue and fever.   HENT:  Positive for hearing loss. Negative for sinus pressure.    Eyes:  Negative for visual disturbance.   Respiratory:  Negative for shortness of breath.    Cardiovascular:  Negative for chest pain.   Gastrointestinal:  Negative for nausea and vomiting.   Genitourinary:  Negative for difficulty urinating.   Musculoskeletal:  Negative for back pain.   Skin:  Negative for rash.   Neurological:  Positive for facial asymmetry. Negative for syncope, speech difficulty and headaches.   Psychiatric/Behavioral:  Negative for confusion.    Objective:     Vital Signs (Most Recent):  Temp: 97.5 °F (36.4 °C) (03/30/23 0911)  Pulse: (!) 45 (03/30/23 1000)  Resp: 18 (03/30/23 1000)  BP: (!) 180/83 (03/30/23 1000)  SpO2: 98 % (03/30/23 1000)   Vital Signs (24h Range):  Temp:  [97.5 °F (36.4 °C)] 97.5 °F (36.4 °C)  Pulse:  [44-46] 45  Resp:  [16-18] 18  SpO2:  [97 %-98 %] 98 %  BP: (144-182)/(70-83) 180/83     Weight: 71.8 kg (158 lb 3.2 oz)  Body mass index is 22.7 kg/m².    Physical Exam  Constitutional:       General: He is not in acute distress.     Appearance: He is well-developed. He is not diaphoretic.   HENT:      Head: Normocephalic and atraumatic.   Eyes:      Pupils: Pupils are equal, round, and reactive to light.   Cardiovascular:      Rate and Rhythm: Normal rate and regular rhythm.      Heart sounds: Normal heart sounds. No murmur heard.    No friction rub. No gallop.   Pulmonary:      Effort: Pulmonary effort is normal. No respiratory distress.      Breath sounds: Normal breath sounds. No stridor. No wheezing or rales.   Abdominal:      General: Bowel sounds are normal. There is no distension.      Palpations: Abdomen is soft. There is no mass.      Tenderness: There is no abdominal tenderness. There is no guarding.   Skin:     General: Skin is warm.      Findings: No erythema.      Comments:  Contusion on forehead, abrasions on arms   Neurological:      Mental Status: He is alert and oriented to person, place, and time.      GCS: GCS eye subscore is 4. GCS verbal subscore is 5. GCS motor subscore is 6.      Cranial Nerves: Facial asymmetry (right sided facial droop) present.         CRANIAL NERVES     CN III, IV, VI   Pupils are equal, round, and reactive to light.     Significant Labs:   Results for orders placed or performed during the hospital encounter of 03/30/23   CBC W/ AUTO DIFFERENTIAL   Result Value Ref Range    WBC 7.07 3.90 - 12.70 K/uL    RBC 4.50 (L) 4.60 - 6.20 M/uL    Hemoglobin 13.1 (L) 14.0 - 18.0 g/dL    Hematocrit 40.5 40.0 - 54.0 %    MCV 90 82 - 98 fL    MCH 29.1 27.0 - 31.0 pg    MCHC 32.3 32.0 - 36.0 g/dL    RDW 13.1 11.5 - 14.5 %    Platelets 144 (L) 150 - 450 K/uL    MPV 11.6 9.2 - 12.9 fL    Immature Granulocytes 0.4 0.0 - 0.5 %    Gran # (ANC) 5.4 1.8 - 7.7 K/uL    Immature Grans (Abs) 0.03 0.00 - 0.04 K/uL    Lymph # 1.0 1.0 - 4.8 K/uL    Mono # 0.5 0.3 - 1.0 K/uL    Eos # 0.1 0.0 - 0.5 K/uL    Baso # 0.02 0.00 - 0.20 K/uL    nRBC 0 0 /100 WBC    Gran % 77.0 (H) 38.0 - 73.0 %    Lymph % 14.1 (L) 18.0 - 48.0 %    Mono % 6.5 4.0 - 15.0 %    Eosinophil % 1.7 0.0 - 8.0 %    Basophil % 0.3 0.0 - 1.9 %    Differential Method Automated    Comprehensive metabolic panel   Result Value Ref Range    Sodium 138 136 - 145 mmol/L    Potassium 4.6 3.5 - 5.1 mmol/L    Chloride 103 95 - 110 mmol/L    CO2 27 23 - 29 mmol/L    Glucose 102 70 - 110 mg/dL    BUN 16 10 - 30 mg/dL    Creatinine 0.7 0.5 - 1.4 mg/dL    Calcium 8.8 8.7 - 10.5 mg/dL    Total Protein 7.3 6.0 - 8.4 g/dL    Albumin 3.1 (L) 3.5 - 5.2 g/dL    Total Bilirubin 1.4 (H) 0.1 - 1.0 mg/dL    Alkaline Phosphatase 62 55 - 135 U/L    AST 18 10 - 40 U/L    ALT 18 10 - 44 U/L    Anion Gap 8 8 - 16 mmol/L    eGFR >60 >60 mL/min/1.73 m^2   Protime-INR   Result Value Ref Range    Prothrombin Time 10.9 9.0 - 12.5 sec    INR 1.0 0.8 - 1.2    TSH   Result Value Ref Range    TSH 3.131 0.400 - 4.000 uIU/mL   APTT   Result Value Ref Range    aPTT 26.7 21.0 - 32.0 sec   Troponin I   Result Value Ref Range    Troponin I 0.018 0.000 - 0.026 ng/mL   POCT glucose   Result Value Ref Range    POCT Glucose 94 70 - 110 mg/dL        Significant Imaging: X-Ray Chest AP Portable  Narrative: EXAMINATION:  XR CHEST AP PORTABLE    CLINICAL HISTORY:  Stroke;    COMPARISON:  06/03/2019    FINDINGS:  The size of the heart is normal. The lungs are clear. There is no pneumothorax.  The costophrenic angles are sharp.  There are surgical clips projected over the right side of the abdomen.  Impression: 1. The lungs are clear.  2. There are surgical clips projected over the right side of the abdomen.  .    Electronically signed by: Kwame Mcmillan MD  Date:    03/30/2023  Time:    09:50  CT Head Without Contrast  Narrative: EXAMINATION:  CT HEAD WITHOUT CONTRAST    CLINICAL HISTORY:  Neuro deficit, acute, stroke suspected;    TECHNIQUE:  Contiguous axial CT images were obtained from the skull base through the vertex without intravenous contrast.    COMPARISON:  Prior CT of the head from 03/22/2021.    FINDINGS:  No intracranial hemorrhage. No mass effect or midline shift. 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  Gray-white differentiation appears well preserved within the cerebrum.  No tissue swelling.  Moderate generalized atrophy without signs of hydrocephalus.  Artificial right globe.  The paranasal sinuses and mastoid air cells are clear.  No concerning osseous findings.  Impression: 1. No acute intracranial hemorrhage.  2. Aspect score is 10/10.  3. 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  All CT scans at this facility use dose modulation, iterative reconstruction, and/or weight base dosing when appropriate to reduce radiation dose to as low  as reasonably achievable.    Electronically signed by: Live Rhodes Jr., MD  Date:    03/30/2023  Time:    08:49

## 2023-03-30 NOTE — H&P
[facility] - Emergency Dept.  [facility] Medicine  History & Physical    Patient Name: Kwame Vásquez Jr.  MRN: [id number]  Patient Class: OP- Observation  Admission Date: 3/30/2023  Attending Physician: Brody Gutierrez MD  Primary Care Provider: Pooja Jimenez MD         Patient information was obtained from relative(s), caregiver / friend and ER records.     Subjective:     Principal Problem:Dysarthria and anarthria    Chief Complaint:   Chief Complaint   Patient presents with    Facial Droop     Pt sent from nursing home with right facial droop.  Pt found on floor, LKN [id number]        HPI: Patient is a 89 y.o.  male with a PMHx of hypothyroidism and tia who presents to the Emergency Department from his assisted living facility for a R-sided facial droop and slurred speech. Patient hard of hearing therefore hx provided by caregiver. Per report, pt last seen normal at 730 am. Caregiver left the pt for 10-20 minutes and came back to him falling face first out of his wheelchair. Right sided facial droop was then reported after picking the pt up. He had similar symptoms last year and was told he had a TIA. No other issues reported at this time.     In the ed, labs unremarkable. 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 HM consulted and patient placed in obs.       Past Medical History:   Diagnosis Date    Glaucoma     Hypothyroidism        Past Surgical History:   Procedure Laterality Date    CHOLECYSTECTOMY      Late 80's    ENUCLEATION Right     TONSILLECTOMY         Review of patient's allergies indicates:  No Known Allergies    No current facility-administered medications on file prior to encounter.     Current Outpatient Medications on File Prior to Encounter   Medication Sig    azelastine (ASTELIN) 137 mcg (0.1 %) nasal spray 1 spray (137 mcg total) by Nasal route 2 (two) times daily.    brimonidine 0.2% (ALPHAGAN) 0.2 % Drop Place 1 drop into the left eye 2 (two) times daily.      finasteride (PROSCAR) 5 mg tablet Take 1 tablet (5 mg total) by mouth once daily.    latanoprost 0.005 % ophthalmic solution Place 1 drop into the left eye every evening.     levothyroxine (SYNTHROID) 100 MCG tablet 1 tab po qd    tamsulosin (FLOMAX) 0.4 mg Cap Take 1 capsule (0.4 mg total) by mouth once daily.    timolol maleate 0.5% (TIMOPTIC) 0.5 % Drop Place 1 drop into the left eye 2 (two) times daily.    brimonidine-timoloL (COMBIGAN) 0.2-0.5 % Drop Place 1 drop into the left eye 2 (two) times daily.     CYANOCOBALAMIN, VITAMIN B-12, ORAL Take by mouth.    polymyxin B sulf-trimethoprim (POLYTRIM) 10,000 unit- 1 mg/mL Drop Place 1 drop into the right eye 3 (three) times daily.    propylene glycoL 0.6 % Drop Place 1 drop into the left eye 2 (two) times daily.    senna (SENOKOT) 8.6 mg tablet Take 1 tablet by mouth once daily.    [DISCONTINUED] mirtazapine (REMERON) 7.5 MG Tab Take 1-2 tablets nightly PRN insomnia    [DISCONTINUED] ofloxacin (OCUFLOX) 0.3 % ophthalmic solution PLACE ONE DROP IN EACH EYE FOUR TIMES DAILY (Patient not taking: Reported on 3/17/2023)     Family History       Problem Relation (Age of Onset)    No Known Problems Mother, Father          Tobacco Use    Smoking status: Never    Smokeless tobacco: Never   Substance and Sexual Activity    Alcohol use: Never    Drug use: Never    Sexual activity: Not on file     Review of Systems   Constitutional:  Negative for fatigue and fever.   HENT:  Positive for hearing loss. Negative for sinus pressure.    Eyes:  Negative for visual disturbance.   Respiratory:  Negative for shortness of breath.    Cardiovascular:  Negative for chest pain.   Gastrointestinal:  Negative for nausea and vomiting.   Genitourinary:  Negative for difficulty urinating.   Musculoskeletal:  Negative for back pain.   Skin:  Negative for rash.   Neurological:  Positive for facial asymmetry. Negative for syncope, speech difficulty and headaches.    Psychiatric/Behavioral:  Negative for confusion.    Objective:     Vital Signs (Most Recent):  Temp: 97.5 °F (36.4 °C) (03/30/23 0911)  Pulse: (!) 45 (03/30/23 1000)  Resp: 18 (03/30/23 1000)  BP: (!) 180/83 (03/30/23 1000)  SpO2: 98 % (03/30/23 1000)   Vital Signs (24h Range):  Temp:  [97.5 °F (36.4 °C)] 97.5 °F (36.4 °C)  Pulse:  [44-46] 45  Resp:  [16-18] 18  SpO2:  [97 %-98 %] 98 %  BP: (144-182)/(70-83) 180/83     Weight: 71.8 kg (158 lb 3.2 oz)  Body mass index is 22.7 kg/m².    Physical Exam  Constitutional:       General: He is not in acute distress.     Appearance: He is well-developed. He is not diaphoretic.   HENT:      Head: Normocephalic and atraumatic.   Eyes:      Pupils: Pupils are equal, round, and reactive to light.   Cardiovascular:      Rate and Rhythm: Normal rate and regular rhythm.      Heart sounds: Normal heart sounds. No murmur heard.    No friction rub. No gallop.   Pulmonary:      Effort: Pulmonary effort is normal. No respiratory distress.      Breath sounds: Normal breath sounds. No stridor. No wheezing or rales.   Abdominal:      General: Bowel sounds are normal. There is no distension.      Palpations: Abdomen is soft. There is no mass.      Tenderness: There is no abdominal tenderness. There is no guarding.   Skin:     General: Skin is warm.      Findings: No erythema.      Comments: Contusion on forehead, abrasions on arms   Neurological:      Mental Status: He is alert and oriented to person, place, and time.      GCS: GCS eye subscore is 4. GCS verbal subscore is 5. GCS motor subscore is 6.      Cranial Nerves: Facial asymmetry (right sided facial droop) present.         CRANIAL NERVES     CN III, IV, VI   Pupils are equal, round, and reactive to light.     Significant Labs:   Results for orders placed or performed during the hospital encounter of 03/30/23   CBC W/ AUTO DIFFERENTIAL   Result Value Ref Range    WBC 7.07 3.90 - 12.70 K/uL    RBC 4.50 (L) 4.60 - 6.20 M/uL     Hemoglobin 13.1 (L) 14.0 - 18.0 g/dL    Hematocrit 40.5 40.0 - 54.0 %    MCV 90 82 - 98 fL    MCH 29.1 27.0 - 31.0 pg    MCHC 32.3 32.0 - 36.0 g/dL    RDW 13.1 11.5 - 14.5 %    Platelets 144 (L) 150 - 450 K/uL    MPV 11.6 9.2 - 12.9 fL    Immature Granulocytes 0.4 0.0 - 0.5 %    Gran # (ANC) 5.4 1.8 - 7.7 K/uL    Immature Grans (Abs) 0.03 0.00 - 0.04 K/uL    Lymph # 1.0 1.0 - 4.8 K/uL    Mono # 0.5 0.3 - 1.0 K/uL    Eos # 0.1 0.0 - 0.5 K/uL    Baso # 0.02 0.00 - 0.20 K/uL    nRBC 0 0 /100 WBC    Gran % 77.0 (H) 38.0 - 73.0 %    Lymph % 14.1 (L) 18.0 - 48.0 %    Mono % 6.5 4.0 - 15.0 %    Eosinophil % 1.7 0.0 - 8.0 %    Basophil % 0.3 0.0 - 1.9 %    Differential Method Automated    Comprehensive metabolic panel   Result Value Ref Range    Sodium 138 136 - 145 mmol/L    Potassium 4.6 3.5 - 5.1 mmol/L    Chloride 103 95 - 110 mmol/L    CO2 27 23 - 29 mmol/L    Glucose 102 70 - 110 mg/dL    BUN 16 10 - 30 mg/dL    Creatinine 0.7 0.5 - 1.4 mg/dL    Calcium 8.8 8.7 - 10.5 mg/dL    Total Protein 7.3 6.0 - 8.4 g/dL    Albumin 3.1 (L) 3.5 - 5.2 g/dL    Total Bilirubin 1.4 (H) 0.1 - 1.0 mg/dL    Alkaline Phosphatase 62 55 - 135 U/L    AST 18 10 - 40 U/L    ALT 18 10 - 44 U/L    Anion Gap 8 8 - 16 mmol/L    eGFR >60 >60 mL/min/1.73 m^2   Protime-INR   Result Value Ref Range    Prothrombin Time 10.9 9.0 - 12.5 sec    INR 1.0 0.8 - 1.2   TSH   Result Value Ref Range    TSH 3.131 0.400 - 4.000 uIU/mL   APTT   Result Value Ref Range    aPTT 26.7 21.0 - 32.0 sec   Troponin I   Result Value Ref Range    Troponin I 0.018 0.000 - 0.026 ng/mL   POCT glucose   Result Value Ref Range    POCT Glucose 94 70 - 110 mg/dL        Significant Imaging: X-Ray Chest AP Portable  Narrative: EXAMINATION:  XR CHEST AP PORTABLE    CLINICAL HISTORY:  Stroke;    COMPARISON:  06/03/2019    FINDINGS:  The size of the heart is normal. The lungs are clear. There is no pneumothorax.  The costophrenic angles are sharp.  There are surgical clips projected over  the right side of the abdomen.  Impression: 1. The lungs are clear.  2. There are surgical clips projected over the right side of the abdomen.  .    Electronically signed by: Kwame Mcmillan MD  Date:    03/30/2023  Time:    09:50  CT Head Without Contrast  Narrative: EXAMINATION:  CT HEAD WITHOUT CONTRAST    CLINICAL HISTORY:  Neuro deficit, acute, stroke suspected;    TECHNIQUE:  Contiguous axial CT images were obtained from the skull base through the vertex without intravenous contrast.    COMPARISON:  Prior CT of the head from 03/22/2021.    FINDINGS:  No intracranial hemorrhage. No mass effect or midline shift. 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  Gray-white differentiation appears well preserved within the cerebrum.  No tissue swelling.  Moderate generalized atrophy without signs of hydrocephalus.  Artificial right globe.  The paranasal sinuses and mastoid air cells are clear.  No concerning osseous findings.  Impression: 1. No acute intracranial hemorrhage.  2. Aspect score is 10/10.  3. 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  All CT scans at this facility use dose modulation, iterative reconstruction, and/or weight base dosing when appropriate to reduce radiation dose to as low as reasonably achievable.    Electronically signed by: Live Rhodes Jr., MD  Date:    03/30/2023  Time:    08:49        Assessment/Plan:     * Dysarthria and anarthria  Right sided facial droop noted  Possible cva  Mri brain pending  Neuro checks  Head CT reviewed   Unable to tolerate PO  Pt/ot/st   Permissive HTN   Hydralazine PRN     Caregiver and grandson at bedside unable to give code status   Will await children      Glaucoma  Resume home eye drops        VTE Risk Mitigation (From admission, onward)         Ordered     IP VTE HIGH RISK PATIENT  Once         03/30/23 1032     Place sequential  compression device  Until discontinued         03/30/23 1032                   On 03/30/2023, patient should be placed in hospital observation services under my care.        Brody Gutierrez MD  Department of Hospital Medicine  '[facility] - Emergency Dept.

## 2023-03-30 NOTE — PROGRESS NOTES
ST evaluation orders received and chart reviewed.  Pt OOR for MRI at this time.  ST to return as schedule allows for communication/swallowing assessment as ordered.    Attempted assessment in PM in ED; however, pt MICHELLE,for transfer to room assignment. ST will f/u in a.m.

## 2023-03-30 NOTE — HPI
"89 yo male w/ PMHx of HLD, hypothyroidism, insomnia who presents to OSH ED s/p being found down in the bathroom in his NH.   States that he was brushing his teeth and "ended up on the floor". Was found face down w/ abrasions noted on his R forehead and R dorsum on hand.   Per NH, he was also noted to have a new RFD and new onset dysarthria.   Symptoms are isolated to the above mentioned deficits and are persistent.   "

## 2023-03-30 NOTE — ASSESSMENT & PLAN NOTE
Right sided facial droop noted  Possible cva  Mri brain pending  Neuro checks  Head CT reviewed   Unable to tolerate PO  Pt/ot/st   Permissive HTN   Hydralazine PRN     Caregiver and grandson at bedside unable to give code status   Will await children

## 2023-03-30 NOTE — PROGRESS NOTES
Consult received.  Patient evaluated by Dr. Chavez [facility] this morning with noted facial weakness and dysarthria.  MRI completed and reviewed.  No diffusion restriction.  No hemorrhage.  Remote infarct left cerebellum.  Chronic white matter changes.    Carotid US with no hemodynamically significant stenosis.    Recommendations  -Would pursue CTA head and neck in light of remote cerebellar infarct to investigate for symptomatic stenosis of the posterior circulation which may be contributing to current symptoms  -Continue ASA 81mg and plavix 75mg x 21 days then monotherapy with ASA  -Atorvastatin 40mg for goal LDL < 70  -PT/OT/SLP to evaluate and treat  -Risk factor modification  -[facility] to follow CTA and make additional recommendations as indicated  -If CTA unremarkable, patient can dispo with therapy recommendations once medically ready  -Ambulatory referral to Vascular Neurology in 4-6 weeks (Consult Order [id number])      Amie Hernandez NP-C  Vascular Neurology  [phone]

## 2023-03-31 VITALS
RESPIRATION RATE: 18 BRPM | SYSTOLIC BLOOD PRESSURE: 164 MMHG | DIASTOLIC BLOOD PRESSURE: 75 MMHG | TEMPERATURE: 98 F | HEIGHT: 70 IN | BODY MASS INDEX: 21.78 KG/M2 | OXYGEN SATURATION: 96 % | WEIGHT: 152.13 LBS | HEART RATE: 58 BPM

## 2023-03-31 PROCEDURE — 92507 TX SP LANG VOICE COMM INDIV: CPT

## 2023-03-31 PROCEDURE — 92610 EVALUATE SWALLOWING FUNCTION: CPT

## 2023-03-31 PROCEDURE — G0378 HOSPITAL OBSERVATION PER HR: HCPCS

## 2023-03-31 PROCEDURE — 94761 N-INVAS EAR/PLS OXIMETRY MLT: CPT

## 2023-03-31 PROCEDURE — 97530 THERAPEUTIC ACTIVITIES: CPT

## 2023-03-31 PROCEDURE — 97166 OT EVAL MOD COMPLEX 45 MIN: CPT

## 2023-03-31 PROCEDURE — 99900035 HC TECH TIME PER 15 MIN (STAT)

## 2023-03-31 PROCEDURE — 97163 PT EVAL HIGH COMPLEX 45 MIN: CPT

## 2023-03-31 PROCEDURE — 25000003 PHARM REV CODE 250: Performed by: FAMILY MEDICINE

## 2023-03-31 RX ORDER — ASPIRIN 81 MG/1
81 TABLET ORAL DAILY
Qty: 30 TABLET | Refills: 0 | Status: SHIPPED | OUTPATIENT
Start: 2023-03-31 | End: 2023-12-14 | Stop reason: SDUPTHER

## 2023-03-31 RX ORDER — ATORVASTATIN CALCIUM 20 MG/1
20 TABLET, FILM COATED ORAL DAILY
Qty: 30 TABLET | Refills: 0 | Status: SHIPPED | OUTPATIENT
Start: 2023-03-31 | End: 2023-09-05

## 2023-03-31 RX ADMIN — BACITRACIN ZINC, NEOMYCIN SULFATE, AND POLYMYXIN B SULFATE: 400; 3.5; 5 OINTMENT TOPICAL at 08:03

## 2023-03-31 RX ADMIN — BRIMONIDINE TARTRATE 1 DROP: 2 SOLUTION/ DROPS OPHTHALMIC at 08:03

## 2023-03-31 NOTE — PT/OT/SLP EVAL
Physical Therapy Evaluation    Patient Name:  Kwame Vásquez Jr.   MRN:  [id number]    Recommendations:     Discharge Recommendations: assisted living facility (24 HOUR CAREGIVERS WITH MAINTENANCE PROGRAM)   Discharge Equipment Recommendations: none   Barriers to discharge: None    Assessment:     Kwame Vásquez Jr. is a 89 y.o. male admitted with a medical diagnosis of Dysarthria and anarthria.  He presents with the following impairments/functional limitations: weakness, gait instability, impaired balance, impaired endurance, impaired self care skills, impaired cognition, impaired functional mobility, decreased coordination, decreased safety awareness, decreased lower extremity function, decreased upper extremity function, decreased ROM, impaired cardiopulmonary response to activity .    Rehab Prognosis: Fair; patient would benefit from acute skilled PT services to address these deficits and reach maximum level of function.    Recent Surgery: * No surgery found *      Plan:     During this hospitalization, patient to be seen 3 x/week to address the identified rehab impairments via therapeutic activities, therapeutic exercises and progress toward the following goals:    Plan of Care Expires:  04/14/23    Subjective     Chief Complaint: WEAKNESS  Patient/Family Comments/goals: NONE STATED  Pain/Comfort:  Pain Rating 1: 0/10  Pain Rating Post-Intervention 1: 0/10    Patients cultural, spiritual, Baptism conflicts given the current situation:      Living Environment:  PT LIVES AT AL WITH 24 HOUR CAREGIVER TO ASSIST WITH ALL GROSS FUNC MOBILITY. PT HAS BEEN ON HOSPICE CARE  Prior to admission, patients level of function was ASSIST WITH ALL GROSS FUNC MOBILITY .  Equipment used at home: wheelchair, hospital bed, grab bar, shower chair.  DME owned (not currently used): none.  Upon discharge, patient will have assistance from PERSONAL CAREGIVER.    Objective:     Communicated with NURSE LUONG AND [facility] CHART REVIEW   "prior to session.  Patient found supine with peripheral IV, telemetry, bed alarm  upon PT entry to room.    General Precautions: Standard, fall  Orthopedic Precautions:N/A   Braces: N/A  Respiratory Status: Room air    Exams:  Cognitive Exam:  Patient is oriented to Person, Place, Time, Situation, and CONFUSED AT TIMES     Functional Mobility:  Bed Mobility:     Rolling Right: moderate assistance  Supine to Sit: moderate assistance  Transfers:     Sit to Stand:  moderate assistance with hand-held assist  Bed to Chair: moderate assistance with  hand-held assist  using  Stand Pivot      AM-PAC 6 CLICK MOBILITY  Total Score:10       Treatment & Education:  PT EDUCATED ON ROLE OF P.T. AND ON REC TO CONT HAVE PT ON MAINTENANCE PROGRAM FOR COMFORT AND CAREGIVER ASSISTANCE AS WELL. P.T. TO TX PT AND CONT EDUCATION WITH CAREGIVER FOR TE TO COMPLETE WITH PT DAILY. PT EDUCATED ON RISK FOR FALLS DUE TO GENERALIZED WEAKNESS, EDUCATED ON "CALL DON'T FALL", ENCOURAGED TO CALL FOR ASSISTANCE WITH ALL NEEDS SUCH AS BED<>CHAIR TRANSFERS OR TRIPS TO BATHROOM. PT AGREED.     Patient left up in chair with all lines intact, call button in reach, chair alarm on, and SON present.    GOALS:   Multidisciplinary Problems       Physical Therapy Goals          Problem: Physical Therapy    Goal Priority Disciplines Outcome Goal Variances Interventions   Physical Therapy Goal     PT, PT/OT      Description: LT23  1. PT WILL COMPLETE BED MOBILITY WITH MIN A  2. PT WILL STAND T/F TO CHAIR WITH MIN A   3. PT WILL COMPLETE B LE TE X 10 REPS OF ROM AND STRENGTHENING.                        History:     Past Medical History:   Diagnosis Date    Glaucoma     Hypothyroidism        Past Surgical History:   Procedure Laterality Date    CHOLECYSTECTOMY      Late 80's    ENUCLEATION Right     TONSILLECTOMY         Time Tracking:     PT Received On: 23  PT Start Time: 1115     PT Stop Time: 1145  PT Total Time (min): 30 min     Billable " Minutes: Evaluation 15 and Therapeutic Activity 15      03/31/2023

## 2023-03-31 NOTE — PLAN OF CARE
Pt remains free from falls/injuries this shift. Safety precautions maintained. No s/s of acute distress noted. VSS. No c/o pain. IVF infusing. Tele monitoring per orders. Continuing with plan of care. Chart check complete and all orders reviewed.

## 2023-03-31 NOTE — PT/OT/SLP EVAL
Speech Language Pathology Evaluation  Bedside Swallow    Patient Name:  Kwame Vásquez Jr.   MRN:  [id number]  Admitting Diagnosis: Dysarthria and anarthria    Recommendations:                 General Recommendations:   Patient and family education   Diet recommendations:  Pending family decision, continue soft solids with thin liquids if family chooses no MBSS/dysphagia intervention.  Continue NPO if family wishes for MBSS and altered dysphagia diet  Aspiration Precautions: Frequent oral care, HOB to 90 degrees, and Ice chips sparingly   General Precautions: Standard, fall  Communication strategies:   Pt is hard of hearing    History:     Past Medical History:   Diagnosis Date    Glaucoma     Hypothyroidism        Past Surgical History:   Procedure Laterality Date    CHOLECYSTECTOMY      Late 80's    ENUCLEATION Right     TONSILLECTOMY         Social History: Patient lives with at [facility] with 24 hour care givers     Chest X-Rays: 3/30/23 The size of the heart is normal. The lungs are clear. There is no pneumothorax.  The costophrenic angles are sharp.  There are surgical clips projected over the right side of the abdomen    Prior diet: Regular with thin liquids       Subjective   Pt was seen lying in bed with his grandson present.  He is awake, alert, communicative, and following directions with max cueing 2/2 being [place] and mild confusion.  In the ed, labs unremarkable. 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    Patient goals: Pt wants to eat and drink    Pain/Comfort:  Pain Rating 1: 0/10    Respiratory Status: Room air    Objective:     Ellen Swallow Protocol:  Ellen Swallow Protocol dictates patient remain NPO if fail screener; (Aquilesr et al. 2014) however, as objective swallow assessment is not available for greater than a week, patient will remain on current diet until objective assessment is completed unless otherwise indicated.     The [place] Swallow Protocol was administered. The patient was alert and  provided the instructions prior to the beginning of the protocol. The patient consumed 3 oz before putting the cup down. Patient drank with consecutive swallows. Overt s/s of pharyngeal dysphagia included coughing/choking after the swallow  and wet vocal quality .       PO Trials:   Patient presented with:     Consistency Amount/Presentation Description   Ice chips (IDDSI 0)  Coughing following the swallow   Thin liquid (IDDSI 0) Via cup rim Coughing following the swallow, spitting out saliva and mucus   Puree (pudding (IDDSI 4) Vanilla  half tablespoon/ clinician fed Coughing following the swallow, spitting out yellow tinged saliva and mucus     *Thickened liquids were not used in this assessment. Lisafe (2018) reported that thickened liquids have no sound evidence as reducing risk of pneumonia in patients with dysphagia and can cause harm by increasing risk of dehydration. It also presents an increased risk of UTI, electrolyte imbalance, constipation, fecal impaction, cognitive impairment, functional decline, and even death (Rich, 2002; Sly, 2016).  This supports the assertion that we should confirm a patient requires thickened liquids with an instrumental swallow study prior to recommending them.    INTERPRETATION AND RISK ASSESSMENT:  Clinical swallow evaluation (CSE) revealed oral phase characterized by lingual, labial, and buccal strength and range of motion adequate for lip closure, bolus preparation and propulsion. The patient had no anterior loss of the bolus with closure of the lips around the cup rim and spoon. Mild residue remained in the oral cavity following the swallow. Patient with overt clinical sign/symptoms of aspiration on liquids and puree. Patient presents with a possibly inefficient swallow as indicated by coughing and throat clearing. Contributing risk factors for dysphagia include weakness with recent fall and confusion. Patient with increased risk for silent aspiration given  potential sensory deficits related to stroke.    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  Pt's grandson at bedside reported that pt will be discharging with hospice services and comfort is the goal.  ST educated grandson on swallow safety, swallow efficiency, oral care, developing aspiration pneumonia, results of clinical bedside swallow evaluation, recommendations for continuing a po diet, options for dysphagia diet, and MBSS.     Assessment:     Kwame Vásquez Jr. is a 89 y.o. male with an SLP diagnosis of Dysphagia.  He presents with signs of possible aspiration at bedside.  Given pt's advanced age, recent fall, a reported decrease in appetite, and a goal of Hospice for comfort, ST does not recommend a MBSS for further assessment and to continue with pleasure po intake.      Diet Recommendations: Pending family decision, continue soft solids with thin liquids if family chooses no MBSS/dysphagia intervention.  Continue NPO if family wishes for MBSS and altered dysphagia diet    Goals:   Multidisciplinary Problems       SLP Goals          Problem: SLP    Goal Priority Disciplines Outcome   SLP Goal     SLP Ongoing, Progressing   Description: 1. ST to provide continued education to patient and family on dysphagia and EOL po intake                       Plan:     Patient to be seen:  1 x/week   Plan of Care expires:  04/04/23  Plan of Care reviewed with:  patient, family   SLP Follow-Up:  Yes       Discharge recommendations:    Home with Hospice     Time Tracking:     SLP Treatment Date:   03/31/23  Speech Start Time:  0945  Speech Stop Time:  1020     Speech Total Time (min):  35 min    Billable Minutes: Speech Therapy Individual 20 and Eval Swallow and Oral Function 15    03/31/2023

## 2023-03-31 NOTE — PT/OT/SLP EVAL
Occupational Therapy   Evaluation and Discharge Note    Name: Kwame Vásquez Jr.  MRN: [id number]  Admitting Diagnosis: Dysarthria and anarthria  Recent Surgery: * No surgery found *      Recommendations:     Discharge Recommendations: assisted living facility (with 24/7 caregiver)  Discharge Equipment Recommendations: hospital bed, bedside commode  Barriers to discharge:  None    Assessment:     Kwame Vásquez Jr. is a 89 y.o. male with a medical diagnosis of Dysarthria and anarthria. Pt requiring A with t/fs and ADLs at baseline. At this time, patient is functioning at their prior level of function and does not require further acute OT services.     Plan:     During this hospitalization, patient does not require further acute OT services.  Please re-consult if situation changes.    Plan of Care Reviewed with: patient, caregiver, grandchild(landen)    D/C OT services.  Subjective     Chief Complaint: none reported  Patient/Family Comments/goals: wants to get out of bed    Occupational Profile:  Living Environment: resident at [facility] with room on the 1st floor. Pt has a caregiver 24/7.  Previous level of function: Pt requires assist with t/fs and uses a w/c for functional mobility. Pt able to propel self in w/c. Pt requires assist with bathing and dressing ADLs, able to toilet and feed himself.  Roles and Routines: does not drive or work.   Equipment Used at home: grab bar, wheelchair, shower chair  Assistance upon Discharge: [facility] staff and caregiver    Pain/Comfort:  Pain Rating 1: 0/10    Objective:     Communicated with: Nurse and epic chart review prior to session.  Patient found supine with peripheral IV, telemetry, bed alarm upon OT entry to room.    General Precautions: Standard, fall  Orthopedic Precautions: N/A  Braces: N/A  Respiratory Status: Room air     Occupational Performance:    Bed Mobility:    Patient completed Rolling/Turning to Right with moderate assistance  Patient  completed Scooting/Bridging with minimum assistance  Patient completed Supine to Sit with moderate assistance and HOB elevated    Functional Mobility/Transfers:  Patient completed Sit <> Stand Transfer with moderate assistance  with  hand-held assist   Patient completed Bed <> Chair Transfer using Stand Pivot technique with moderate assistance with hand-held assist  Pt rocks back and forth in chair to gain momentum for sit>stand.    Activities of Daily Living:  Lower Body Dressing: moderate assistance azam socks EOB    Cognitive/Visual Perceptual:  Cognitive/Psychosocial Skills:     -       Oriented to: Person, Place, Time, and Situation   -       Follows Commands/attention:Follows multistep  commands  -       Communication: dysarthria  -       Safety awareness/insight to disability: impaired   Visual/Perceptual:      -Impaired  pt with prosthetic R eye .  Pt is hard of hearing.  Pt confused at times.    Physical Exam:  Skin integrity: contusion on forehead  Sensation:    -       Intact  Upper Extremity Range of Motion:     -       Right Upper Extremity: limited AROM in shoulder, elbow WNL  -       Left Upper Extremity: limited AROM in shoulder, elbow WNL  Upper Extremity Strength:    -       Right Upper Extremity: 3-/5 in shoulder, 4/5 in elbow  -       Left Upper Extremity: 3/-5 in shoulder, 4/5 in elbow   Strength:    -       Right Upper Extremity: WFL  -       Left Upper Extremity: WFL  Pt with restless legs throughout eval.    AMPAC 6 Click ADL:  AMPAC Total Score: 15    Treatment & Education:  Patient and grandson educated on role of OT in acute setting and benefits of participation. Educated on techniques to use to increase independence and decrease fall risk with functional transfers. Educated on importance of OOB activity and calling for A to transfer back to bed/meet needs. Encouraged completion of B UE AROM therex throughout the day to tolerance to increase functional strength and activity tolerance.  Educated patient on importance of increased tolerance to upright position and direct impact on CV endurance and strength. Patient encouraged to sit up in chair/EOB for a minimum of 2 consecutive hours per day and with meals.  Patient stated understanding and in agreement with POC.     Pt currently at [facility] and does not require skilled acute OT services at this time. Discharge from OT. Defer to PT.    Patient left up in chair with all lines intact, call button in reach, chair alarm on, and grandson present    GOALS:   Multidisciplinary Problems       Occupational Therapy Goals       Not on file                    History:     Past Medical History:   Diagnosis Date    Glaucoma     Hypothyroidism          Past Surgical History:   Procedure Laterality Date    CHOLECYSTECTOMY      Late 80's    ENUCLEATION Right     TONSILLECTOMY         Time Tracking:     OT Date of Treatment: 03/31/23  OT Start Time: 1115  OT Stop Time: 1145  OT Total Time (min): 30 min    Billable Minutes:Evaluation 15  Therapeutic Activity 15    3/31/2023  Komal Burnham OT

## 2023-03-31 NOTE — DISCHARGE SUMMARY
[facility] Medicine  Discharge Summary      Patient Name: Kwame Vásquez Jr.  MRN: [id number]  [facility]: [id number]  Patient Class: OP- Observation  Admission Date: 3/30/2023  Hospital Length of Stay: 0 days  Discharge Date and Time: 3/31/2023  5:13 PM  Attending Physician:  Dr. Linda Pitt   Discharging Provider: Suha Barajas NP  Primary Care Provider: Pooja Jimenez MD    Primary Care Team: Networked reference to record PCT     HPI:   Patient is a 89 y.o.  male with a PMHx of hypothyroidism and tia who presents to the Emergency Department from his assisted living facility for a R-sided facial droop and slurred speech. Patient hard of hearing therefore hx provided by caregiver. Per report, pt last seen normal at 730 am. Caregiver left the pt for 10-20 minutes and came back to him falling face first out of his wheelchair. Right sided facial droop was then reported after picking the pt up. He had similar symptoms last year and was told he had a TIA. No other issues reported at this time.     In the ed, labs unremarkable. 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 HM consulted and patient placed in obs.       * No surgery found *      Hospital Course:   Patient admitted to observation for further evaluation of dysarthria and anarthria in the setting of recent fall. Neurology consulted. MRI showed no acute infarction or acute intracranial hemorrhage. Soft tissue contusion about the right frontal scalp. Mild T2 alteration of the periventricular white matter likely relating to chronic microangiopathic ischemia. Ventriculomegaly with moderate cerebral atrophy.  The ventriculomegaly is likely related to central atrophy/ex vacuo dilation.  Carotid ultrasound showed no significant stenosis.  CT of head unremarkable.  Chest x-ray unremarkable.  CTA of head and neck showed negative for hemodynamically significant stenosis of the major arterial vasculature of the head and neck.   Echo showed EF of 60% and mild aortic regurgitation. Of note, patient recently discharged from hospice.  PT/OT evaluation completed with discharge to assisted living facility with 24 hour caregiver recommended- patient elected to discharge home with caregiver and home health established.  Speech therapy evaluation completed with evidence of aspiration noted.  Modified barium swallow study refused by family and continued pleasure feeding (soft solids with thin liquids) elected. Patient did not qualify for home oxygen prior evaluation.  Hospital bed and suctioned ordered for home use and arranged per social work.  Social work consulted to assist with discharge planning DME coordinated and ambulatory referral to home health placed.  Patient seen examined in deemed stable for discharge to home escalated by caregiver.  Medications reconciled with aspirin, atorvastatin, and Neosporin prescribed.  Patient/family instructed to follow-up with PCP, Ophthalmology, and Neurology upon discharge for further evaluation.        Goals of Care Treatment Preferences:  Code Status: Full Code      Consults:   Consults (From admission, onward)          Status Ordering Provider     Inpatient consult to Social Work  Once        Provider:  (Not yet assigned)    Completed SASHA GOMES     Inpatient consult to Social Work  Once        Provider:  (Not yet assigned)    Completed SASHA GOMES     Inpatient consult to Telemedicine-General Neurology  Once        Provider:  Amie Hernandez NP    Completed MARCO ROUSEI     IP consult to case management/social work  Once        Provider:  (Not yet assigned)    Completed NASIM GABBI     Consult to Telemedicine - Acute Stroke  Once        Provider:  (Not yet assigned)    Completed JAMES WELLS            Final Active Diagnoses:    Diagnosis Date Noted POA    PRINCIPAL PROBLEM:  Dysarthria and anarthria [R47.1] 03/30/2023 Yes    Glaucoma [H40.9] 06/03/2019 Yes      Problems Resolved During this Admission:  "      Discharged Condition: stable    Disposition: Home-Health Care [facility]    Follow Up:   Follow-up Information       Pooja Jimenez MD Follow up in 3 day(s).    Specialty: Family Medicine  Why: -hospital follow up  Contact information:  [address]  [facility] [address]  [phone]               Amie Hernandez NP Follow up in 1 month(s).    Specialty: Neurology  Why: -hospital follow up- 4-6 weeks  Contact information:  [address]  [facility] [address]  [phone]                           Patient Instructions:      HOSPITAL BED FOR HOME USE     Order Specific Question Answer Comments   Type: Semi-electric    Length of need (1-99 months): 99    Does patient have medical equipment at home? wheelchair    Height: 5' 10" (1.778 m)    Weight: 69 kg (152 lb 1.9 oz)    Please check all that apply: Patient requires positioning of the body in ways not feasible in an ordinary bed due to a medical condition which is expected to last at least one month.    Please check all that apply: Patient requires, for the alleviation of pain, positioning of the body in ways not feasible in an ordinary bed.    Please check all that apply: Patient requires the head of bed to be elevated more than 30 degrees most of the time due to congestive heart failure, chronic pulmonary disease, or aspiration.  Pillows and wedges have been considered and ruled out.      SUCTION MACHINE FOR HOME USE     Order Specific Question Answer Comments   Height: 5' 10" (1.778 m)    Weight: 69 kg (152 lb 1.9 oz)    Type of suction: Intermittent    Frequency: QID    Disposable cannisters? Yes    Connective tubing? Yes    [place]? Yes    Length of need (1-99 months): 99    Does patient have medical equipment at home? wheelchair      Ambulatory referral/consult to Home Health   Referral Priority: Routine Referral Type: Home Health   Referral Reason: Specialty Services Required   Requested Specialty: Home Health Services   Number of " Visits Requested: 1     Diet Dysphagia Soft     Notify your health care provider if you experience any of the following:  temperature >100.4     Notify your health care provider if you experience any of the following:  persistent nausea and vomiting or diarrhea     Notify your health care provider if you experience any of the following:  increased confusion or weakness     Notify your health care provider if you experience any of the following:  persistent dizziness, light-headedness, or visual disturbances     Notify your health care provider if you experience any of the following:  difficulty breathing or increased cough     Notify your health care provider if you experience any of the following:  redness, tenderness, or signs of infection (pain, swelling, redness, odor or green/yellow discharge around incision site)     Activity as tolerated       Significant Diagnostic Studies: Labs: All labs within the past 24 hours have been reviewed    Pending Diagnostic Studies:       Procedure Component Value Units Date/Time    Urinalysis, Reflex to Urine Culture Urine, Clean Catch [[id number]] Collected: 03/30/23 1028    Order Status: Sent Lab Status: In process Updated: 03/30/23 1028    Specimen: Urine            Medications:  Reconciled Home Medications:      Medication List        START taking these medications      aspirin 81 MG EC tablet  Commonly known as: ECOTRIN  Take 1 tablet (81 mg total) by mouth once daily.     atorvastatin 20 MG tablet  Commonly known as: LIPITOR  Take 1 tablet (20 mg total) by mouth once daily.     neomycin-bacitracin-polymyxin ointment  Commonly known as: NEOSPORIN  Apply topically 3 (three) times daily. for 10 days            CONTINUE taking these medications      azelastine 137 mcg (0.1 %) nasal spray  Commonly known as: ASTELIN  1 spray (137 mcg total) by Nasal route 2 (two) times daily.     brimonidine 0.2% 0.2 % Drop  Commonly known as: ALPHAGAN  Place 1 drop into the left eye 2 (two)  times daily.     finasteride 5 mg tablet  Commonly known as: PROSCAR  Take 1 tablet (5 mg total) by mouth once daily.     latanoprost 0.005 % ophthalmic solution  Place 1 drop into the left eye every evening.     levothyroxine 100 MCG tablet  Commonly known as: SYNTHROID  1 tab po qd     senna 8.6 mg tablet  Commonly known as: SENOKOT  Take 1 tablet by mouth once daily.     tamsulosin 0.4 mg Cap  Commonly known as: FLOMAX  Take 1 capsule (0.4 mg total) by mouth once daily.            STOP taking these medications      propylene glycoL 0.6 % Drop     timolol maleate 0.5% 0.5 % Drop  Commonly known as: TIMOPTIC              Indwelling Lines/Drains at time of discharge:   Lines/Drains/Airways       None                   Time spent on the discharge of patient: > 40 minutes         Suha Barajas NP  Department of Hospital Medicine  '[place] - Med Surg

## 2023-03-31 NOTE — PLAN OF CARE
OT eval completed. Pt requiring assist with t/fs and bathing/dressing ADLs at baseline. Pt uses wheelchair and has 24/7 caregiver. Pt currently at [facility] and does not require skilled acute OT services at this time. Discharge from OT. Defer to PT for t/fs and caregiver assist. Recommends home at [facility] with 24/7 caregiver.

## 2023-03-31 NOTE — PLAN OF CARE
[facility] - Med Surg  Discharge Final Note    Primary Care Provider: Pooja Jimenez MD    Expected Discharge Date:     Final Discharge Note (most recent)       Final Note - 03/31/23 1525          Final Note    Assessment Type Final Discharge Note     Anticipated Discharge Disposition [place] [facility]     Hospital Resources/Appts/Education Provided Appointments scheduled by Navigator/Coordinator;Appointments scheduled and added to AVS        Post-Acute Status    Post-Acute Authorization [facility] Status P[facility] Delivery     Home Health Status Set-up Complete/Auth obtained     Discharge Delays None known at this time                   PCP appointment scheduled and added to AVS.     [facility] arranged and will admit pt tomorrow.     Hospital bed order sent to [facility]; pending insurance approval and delivery.     Suction machine sent to [facility]; pending insurance approval and delivery.

## 2023-03-31 NOTE — CARE UPDATE
Home Oxygen Evaluation - [facility] - Cardiopulmonary Department      Date Performed: 3/31/2023      1) Patient's Home O2 Sat on room air, while at rest: Room Air SpO2 At Rest: (P) 98 %        If O2 sats on room air at rest are 88% or below, patient qualifies.  Document O2 liter flow needed in Step 2.  If O2 sats are 89% or above, complete Step 3.        2)  If patient is not ambulated and O2 sats are <88%, what is the O2 liter flow required to meet ordered saturation? Home O2 Eval Comments: (P) pt does not qualify for home o2    If O2 sats on room air while exercising remain 89% or above patient does not qualify, no further testing needed Document N/A in step 3. If O2 sats on room air while exercising are 88% or below, continue to Step 4.    3) Patient's O2 Sat on room air while exercisin) Patient's O2 Sat while exercising on O2:   at           (Must show improvement from #4 for patients to qualify)            Pt does not walk independently. Pt on RA, spo2 with minimal exertion is 97%. Pt does not qualify for home oxygen.

## 2023-03-31 NOTE — PLAN OF CARE
Patient remained free from injury. Personal sitter at bedside. Neuro q4 checks. NPO. IVF maintained. No s/s of acute distress. 12hr chart check complete.

## 2023-03-31 NOTE — DISCHARGE INSTRUCTIONS
Follow up with ophthalmology - to evaluate eye drops  -HOLD Timolol eye drops in the setting of bradycardia-

## 2023-04-01 NOTE — HOSPITAL COURSE
Patient admitted to observation for further evaluation of dysarthria and anarthria.  Neurology consulted. MRI showed no acute infarction or acute intracranial hemorrhage. Soft tissue contusion about the right frontal scalp. Mild T2 alteration of the periventricular white matter likely relating to chronic microangiopathic ischemia. Ventriculomegaly with moderate cerebral atrophy.  The ventriculomegaly is likely related to central atrophy/ex vacuo dilation.  Carotid ultrasound showed no significant stenosis.  CT of head unremarkable.  Chest x-ray unremarkable.  CTA of head and neck showed negative for hemodynamically significant stenosis of the major arterial vasculature of the head and neck.  Echo showed EF of 60% and mild aortic regurgitation. Of note, patient recently discharged from hospice.  PT/OT evaluation completed with discharge to assisted living facility with 24 hour caregiver recommended- patient elected to discharge home with caregiver and home health established.  Speech therapy evaluation completed with evidence of aspiration noted.  Modified barium swallow study refused by family and continued pleasure feeding (soft solids with thin liquids) elected. Patient did not qualify for home oxygen prior evaluation.  Hospital bed and suctioned ordered for home use and arranged per social work.  Patient seen examined in deemed stable for discharge to home escalated by caregiver.  Medications reconciled with aspirin, atorvastatin, and Neosporin prescribed.  Patient/family instructed to follow-up with PCP and Neurology upon discharge for further evaluation.

## 2023-04-04 ENCOUNTER — OFFICE VISIT (OUTPATIENT)
Dept: FAMILY MEDICINE | Facility: CLINIC | Age: 88
End: 2023-04-04
Attending: FAMILY MEDICINE
Payer: MEDICARE

## 2023-04-04 VITALS
HEIGHT: 70 IN | OXYGEN SATURATION: 98 % | DIASTOLIC BLOOD PRESSURE: 72 MMHG | SYSTOLIC BLOOD PRESSURE: 130 MMHG | BODY MASS INDEX: 21.78 KG/M2 | TEMPERATURE: 98 F | WEIGHT: 152.13 LBS | HEART RATE: 52 BPM

## 2023-04-04 DIAGNOSIS — G45.9 TIA (TRANSIENT ISCHEMIC ATTACK): ICD-10-CM

## 2023-04-04 DIAGNOSIS — R47.1 DYSARTHRIA AND ANARTHRIA: Primary | ICD-10-CM

## 2023-04-04 DIAGNOSIS — E11.69 DM TYPE 2 WITH DIABETIC DYSLIPIDEMIA: ICD-10-CM

## 2023-04-04 DIAGNOSIS — E03.1 CONGENITAL HYPOTHYROIDISM WITHOUT GOITER: ICD-10-CM

## 2023-04-04 DIAGNOSIS — E78.5 DM TYPE 2 WITH DIABETIC DYSLIPIDEMIA: ICD-10-CM

## 2023-04-04 PROCEDURE — 99496 TRANSITIONAL CARE MANAGE SERVICE 7 DAY DISCHARGE: ICD-10-PCS | Mod: S$PBB,,, | Performed by: FAMILY MEDICINE

## 2023-04-04 PROCEDURE — 99496 TRANSJ CARE MGMT HIGH F2F 7D: CPT | Mod: S$PBB,,, | Performed by: FAMILY MEDICINE

## 2023-04-04 PROCEDURE — 99999 PR PBB SHADOW E&M-EST. PATIENT-LVL IV: ICD-10-PCS | Mod: PBBFAC,,, | Performed by: FAMILY MEDICINE

## 2023-04-04 PROCEDURE — 99999 PR PBB SHADOW E&M-EST. PATIENT-LVL IV: CPT | Mod: PBBFAC,,, | Performed by: FAMILY MEDICINE

## 2023-04-04 PROCEDURE — 99214 OFFICE O/P EST MOD 30 MIN: CPT | Mod: PBBFAC,PO | Performed by: FAMILY MEDICINE

## 2023-04-04 RX ORDER — CETIRIZINE HYDROCHLORIDE 10 MG/1
10 TABLET ORAL DAILY PRN
COMMUNITY
Start: 2023-04-03 | End: 2023-12-14 | Stop reason: SDUPTHER

## 2023-04-04 RX ORDER — TEMAZEPAM 30 MG/1
CAPSULE ORAL
COMMUNITY
Start: 2023-04-01 | End: 2023-06-28 | Stop reason: SDUPTHER

## 2023-04-04 NOTE — PROGRESS NOTES
Subjective:       Patient ID: Kwame Vásquez Jr. is a 89 y.o. male.    Chief Complaint: Follow-up        Transitional Care Note    Family and/or Caretaker present at visit?  yes.  Diagnostic tests reviewed/disposition: I have reviewed all completed as well as pending diagnostic tests at the time of discharge.  Disease/illness education: yes   Home health/community services discussion/referrals: Patient has home health established at [facility] Establishment or re-establishment of referral orders for community resources: No other necessary community resources.   Discussion with other health care providers: No discussion with other health care providers necessary.          89 y old male with hx of hypothyroidism and TIA  f.u on hospitalization on 3/30  after caregiver noticed R facial droop and slurred speech when he picked him off the floor after falling face forward ..Brain CT demonstrated chronic microvascular chagres in left cerebellum . Brain MRI did not demonstrate any new findings . He was noted to have contusion on R frontal  scalp . Carotid u/s ,nor CTA of neck and head demonstrated significant stenosis  . Echocardiogram demonstrated mild aortic regurgitation   . PT/OT recommended assisted  living  but pt elected to be discharged home with home health  and his caregiver  . Speech therapy noted aspiration . Barium study was declined . He was administered pleasure feedings ,he was also provided hospital bed  and suction . He was started on aspirin 81 mg and lipitor 20 mg . Doing well today . Still with some dysarthria .           Follow-up  Review of Systems   Constitutional: Negative.    HENT: Negative.     Eyes: Negative.    Respiratory: Negative.     Cardiovascular: Negative.    Gastrointestinal: Negative.    Genitourinary: Negative.    Musculoskeletal: Negative.    Skin: Negative.    Neurological:  Positive for speech difficulty.   Hematological: Negative.      Objective:      Physical Exam  Constitutional:        General: He is not in acute distress.     Appearance: He is well-developed. He is not diaphoretic.   HENT:      Head: Normocephalic and atraumatic.      Right Ear: External ear normal.      Left Ear: External ear normal.      Nose: Nose normal.      Mouth/Throat:      Pharynx: No oropharyngeal exudate.   Eyes:      General: No scleral icterus.        Right eye: No discharge.         Left eye: No discharge.      Conjunctiva/sclera: Conjunctivae normal.      Pupils: Pupils are equal, round, and reactive to light.   Neck:      Thyroid: No thyromegaly.      Vascular: No JVD.      Trachea: No tracheal deviation.   Cardiovascular:      Rate and Rhythm: Normal rate and regular rhythm.      Heart sounds: Normal heart sounds. No murmur heard.    No friction rub. No gallop.   Pulmonary:      Effort: Pulmonary effort is normal. No respiratory distress.      Breath sounds: Normal breath sounds. No stridor. No wheezing or rales.   Chest:      Chest wall: No tenderness.   Abdominal:      General: Bowel sounds are normal. There is no distension.      Palpations: Abdomen is soft.      Tenderness: There is no abdominal tenderness. There is no guarding or rebound.   Musculoskeletal:         General: No tenderness. Normal range of motion.      Cervical back: Normal range of motion and neck supple.   Lymphadenopathy:      Cervical: No cervical adenopathy.   Skin:     General: Skin is warm and dry.      Coloration: Skin is not pale.      Findings: No erythema or rash.   Neurological:      Mental Status: He is alert and oriented to person, place, and time.      GCS: GCS eye subscore is 4. GCS verbal subscore is 5. GCS motor subscore is 6.      Cranial Nerves: Facial asymmetry present. No cranial nerve deficit.      Motor: Motor function is intact. No abnormal muscle tone.      Coordination: Coordination is intact. Coordination normal.      Deep Tendon Reflexes: Reflexes are normal and symmetric. Reflexes normal.   Psychiatric:          Behavior: Behavior normal.         Thought Content: Thought content normal.         Judgment: Judgment normal.       Assessment:       1. Dysarthria and anarthria    2. Congenital hypothyroidism without goiter    3. DM type 2 with diabetic dyslipidemia        Plan:     Kwame was seen today for follow-up.    Diagnoses and all orders for this visit:    Dysarthria and anarthria    Congenital hypothyroidism without goiter    DM type 2 with diabetic dyslipidemia     Continue PT/OT ,Neurology ,continue statin and aspirin   Controlled . Cont  levothyroxine

## 2023-04-19 ENCOUNTER — TELEPHONE (OUTPATIENT)
Dept: FAMILY MEDICINE | Facility: CLINIC | Age: 88
End: 2023-04-19
Payer: MEDICARE

## 2023-04-19 NOTE — TELEPHONE ENCOUNTER
----- Message from Sarah Ruff sent at 4/19/2023 11:24 AM CDT -----  Contact: [facility]  Mirta is calling in regards to getting Home Health assistance for the pt.Please call back at [phone]            Thanks  MEHUL

## 2023-04-29 ENCOUNTER — EXTERNAL HOME HEALTH (OUTPATIENT)
Dept: HOME HEALTH SERVICES | Facility: HOSPITAL | Age: 88
End: 2023-04-29
Payer: MEDICARE

## 2023-05-15 ENCOUNTER — DOCUMENT SCAN (OUTPATIENT)
Dept: HOME HEALTH SERVICES | Facility: HOSPITAL | Age: 88
End: 2023-05-15
Payer: MEDICARE

## 2023-06-28 ENCOUNTER — PATIENT MESSAGE (OUTPATIENT)
Dept: FAMILY MEDICINE | Facility: CLINIC | Age: 88
End: 2023-06-28
Payer: MEDICARE

## 2023-06-28 RX ORDER — TEMAZEPAM 30 MG/1
CAPSULE ORAL
Qty: 90 CAPSULE | Refills: 1 | Status: SHIPPED | OUTPATIENT
Start: 2023-06-28 | End: 2023-12-14 | Stop reason: SDUPTHER

## 2023-06-29 RX ORDER — LEVOTHYROXINE SODIUM 88 UG/1
88 TABLET ORAL
Qty: 90 TABLET | Refills: 3 | Status: SHIPPED | OUTPATIENT
Start: 2023-06-29 | End: 2023-12-14 | Stop reason: SDUPTHER

## 2023-09-01 NOTE — TELEPHONE ENCOUNTER
Refill Routing Note   Medication(s) are not appropriate for processing by [facility] for the following reason(s):      No active prescription written by provider    [facility] action(s):  Defer Care Due:  None identified            Appointments  past 12m or future 3m with PCP    Date Provider   Last Visit   4/4/2023 Pooja Jimenez MD   Next Visit   Visit date not found Pooja Jimenez MD   ED visits in past 90 days: 0        Note composed:11:53 AM 09/01/2023

## 2023-09-01 NOTE — TELEPHONE ENCOUNTER
No care due was identified.  [facility] Embedded Care Due Messages. Reference number: [id number].   9/01/2023 9:56:10 AM CDT

## 2023-09-05 RX ORDER — ATORVASTATIN CALCIUM 20 MG/1
20 TABLET, FILM COATED ORAL DAILY
Qty: 90 TABLET | Refills: 2 | Status: SHIPPED | OUTPATIENT
Start: 2023-09-05 | End: 2023-12-14 | Stop reason: SDUPTHER

## 2023-12-14 ENCOUNTER — OFFICE VISIT (OUTPATIENT)
Dept: FAMILY MEDICINE | Facility: CLINIC | Age: 88
End: 2023-12-14
Payer: MEDICARE

## 2023-12-14 VITALS
OXYGEN SATURATION: 97 % | DIASTOLIC BLOOD PRESSURE: 80 MMHG | TEMPERATURE: 98 F | HEIGHT: 70 IN | BODY MASS INDEX: 21.83 KG/M2 | SYSTOLIC BLOOD PRESSURE: 134 MMHG | HEART RATE: 57 BPM

## 2023-12-14 DIAGNOSIS — K11.7 SIALORRHEA: ICD-10-CM

## 2023-12-14 DIAGNOSIS — R54 AGE-RELATED PHYSICAL DEBILITY: ICD-10-CM

## 2023-12-14 DIAGNOSIS — E03.9 HYPOTHYROIDISM, UNSPECIFIED TYPE: ICD-10-CM

## 2023-12-14 DIAGNOSIS — E78.5 DM TYPE 2 WITH DIABETIC DYSLIPIDEMIA: Primary | ICD-10-CM

## 2023-12-14 DIAGNOSIS — N40.0 BENIGN PROSTATIC HYPERPLASIA, UNSPECIFIED WHETHER LOWER URINARY TRACT SYMPTOMS PRESENT: ICD-10-CM

## 2023-12-14 DIAGNOSIS — K59.09 CHRONIC CONSTIPATION: ICD-10-CM

## 2023-12-14 DIAGNOSIS — E11.69 DM TYPE 2 WITH DIABETIC DYSLIPIDEMIA: Primary | ICD-10-CM

## 2023-12-14 DIAGNOSIS — Z23 NEED FOR VACCINATION: ICD-10-CM

## 2023-12-14 DIAGNOSIS — J30.9 ALLERGIC RHINITIS, UNSPECIFIED SEASONALITY, UNSPECIFIED TRIGGER: ICD-10-CM

## 2023-12-14 PROCEDURE — 99215 OFFICE O/P EST HI 40 MIN: CPT | Mod: S$PBB,,, | Performed by: NURSE PRACTITIONER

## 2023-12-14 PROCEDURE — 99999 PR PBB SHADOW E&M-EST. PATIENT-LVL III: CPT | Mod: PBBFAC,,, | Performed by: NURSE PRACTITIONER

## 2023-12-14 PROCEDURE — 99999PBSHW FLU VACCINE - QUADRIVALENT - ADJUVANTED: ICD-10-PCS | Mod: PBBFAC,,,

## 2023-12-14 PROCEDURE — 99213 OFFICE O/P EST LOW 20 MIN: CPT | Mod: PBBFAC,PO | Performed by: NURSE PRACTITIONER

## 2023-12-14 PROCEDURE — 99999PBSHW FLU VACCINE - QUADRIVALENT - ADJUVANTED: Mod: PBBFAC,,,

## 2023-12-14 PROCEDURE — G0008 ADMIN INFLUENZA VIRUS VAC: HCPCS | Mod: PBBFAC,PO

## 2023-12-14 PROCEDURE — 99215 PR OFFICE/OUTPT VISIT, EST, LEVL V, 40-54 MIN: ICD-10-PCS | Mod: S$PBB,,, | Performed by: NURSE PRACTITIONER

## 2023-12-14 PROCEDURE — 99999 PR PBB SHADOW E&M-EST. PATIENT-LVL III: ICD-10-PCS | Mod: PBBFAC,,, | Performed by: NURSE PRACTITIONER

## 2023-12-14 RX ORDER — LEVOTHYROXINE SODIUM 88 UG/1
88 TABLET ORAL
Qty: 90 TABLET | Refills: 3 | Status: SHIPPED | OUTPATIENT
Start: 2023-12-14 | End: 2024-12-13

## 2023-12-14 RX ORDER — ERYTHROMYCIN 5 MG/G
OINTMENT OPHTHALMIC NIGHTLY
Qty: 3.5 G | Refills: 1 | Status: SHIPPED | OUTPATIENT
Start: 2023-12-14

## 2023-12-14 RX ORDER — LACTULOSE 10 G/15ML
10 SOLUTION ORAL 2 TIMES DAILY PRN
Qty: 300 ML | Refills: 6 | Status: SHIPPED | OUTPATIENT
Start: 2023-12-14

## 2023-12-14 RX ORDER — BRIMONIDINE TARTRATE 2 MG/ML
1 SOLUTION/ DROPS OPHTHALMIC 2 TIMES DAILY
Qty: 15 ML | Refills: 4 | Status: SHIPPED | OUTPATIENT
Start: 2023-12-14

## 2023-12-14 RX ORDER — TAMSULOSIN HYDROCHLORIDE 0.4 MG/1
1 CAPSULE ORAL DAILY
Qty: 90 CAPSULE | Refills: 3 | Status: SHIPPED | OUTPATIENT
Start: 2023-12-14

## 2023-12-14 RX ORDER — CETIRIZINE HYDROCHLORIDE 10 MG/1
10 TABLET ORAL DAILY PRN
Qty: 30 TABLET | Refills: 3 | Status: SHIPPED | OUTPATIENT
Start: 2023-12-14

## 2023-12-14 RX ORDER — FINASTERIDE 5 MG/1
5 TABLET, FILM COATED ORAL DAILY
Qty: 90 TABLET | Refills: 3 | Status: SHIPPED | OUTPATIENT
Start: 2023-12-14

## 2023-12-14 RX ORDER — ATORVASTATIN CALCIUM 20 MG/1
20 TABLET, FILM COATED ORAL DAILY
Qty: 90 TABLET | Refills: 3 | Status: SHIPPED | OUTPATIENT
Start: 2023-12-14

## 2023-12-14 RX ORDER — ASPIRIN 81 MG/1
81 TABLET ORAL DAILY
Qty: 90 TABLET | Refills: 3 | Status: SHIPPED | OUTPATIENT
Start: 2023-12-14 | End: 2024-01-13

## 2023-12-14 RX ORDER — LATANOPROST 50 UG/ML
1 SOLUTION/ DROPS OPHTHALMIC NIGHTLY
Qty: 2.5 ML | Refills: 6 | Status: SHIPPED | OUTPATIENT
Start: 2023-12-14

## 2023-12-14 RX ORDER — SCOLOPAMINE TRANSDERMAL SYSTEM 1 MG/1
1 PATCH, EXTENDED RELEASE TRANSDERMAL
Qty: 24 PATCH | Refills: 1 | OUTPATIENT
Start: 2023-12-14 | End: 2023-12-17

## 2023-12-14 RX ORDER — TEMAZEPAM 30 MG/1
CAPSULE ORAL
Qty: 90 CAPSULE | Refills: 1 | Status: SHIPPED | OUTPATIENT
Start: 2023-12-14

## 2023-12-15 ENCOUNTER — PATIENT MESSAGE (OUTPATIENT)
Dept: FAMILY MEDICINE | Facility: CLINIC | Age: 88
End: 2023-12-15
Payer: MEDICARE

## 2023-12-15 ENCOUNTER — TELEPHONE (OUTPATIENT)
Dept: FAMILY MEDICINE | Facility: CLINIC | Age: 88
End: 2023-12-15
Payer: MEDICARE

## 2023-12-15 RX ORDER — CLONAZEPAM 0.5 MG/1
TABLET ORAL
Qty: 30 TABLET | Refills: 3 | Status: SHIPPED | OUTPATIENT
Start: 2023-12-15

## 2023-12-15 NOTE — TELEPHONE ENCOUNTER
Spoke to Madison at [facility] and informed her that we did recevie fax but the second page was all black. She states that it was just a hospice order and if Dr. Mccallum agreed to have him re-evaluated, they would need order that says assess and admit.

## 2023-12-16 ENCOUNTER — PATIENT MESSAGE (OUTPATIENT)
Dept: ADMINISTRATIVE | Facility: HOSPITAL | Age: 88
End: 2023-12-16
Payer: MEDICARE

## 2023-12-17 ENCOUNTER — HOSPITAL ENCOUNTER (EMERGENCY)
Facility: HOSPITAL | Age: 88
Discharge: HOME | End: 2023-12-17
Attending: EMERGENCY MEDICINE
Payer: MEDICARE

## 2023-12-17 VITALS
SYSTOLIC BLOOD PRESSURE: 160 MMHG | WEIGHT: 155.63 LBS | RESPIRATION RATE: 28 BRPM | HEART RATE: 48 BPM | OXYGEN SATURATION: 96 % | BODY MASS INDEX: 22.33 KG/M2 | TEMPERATURE: 98 F | DIASTOLIC BLOOD PRESSURE: 93 MMHG

## 2023-12-17 DIAGNOSIS — T50.905A MEDICATION SIDE EFFECT, INITIAL ENCOUNTER: Primary | ICD-10-CM

## 2023-12-17 DIAGNOSIS — F02.B0 MODERATE ALZHEIMER'S DEMENTIA WITHOUT BEHAVIORAL DISTURBANCE, PSYCHOTIC DISTURBANCE, MOOD DISTURBANCE, OR ANXIETY, UNSPECIFIED TIMING OF DEMENTIA ONSET: ICD-10-CM

## 2023-12-17 DIAGNOSIS — R53.1 WEAKNESS: ICD-10-CM

## 2023-12-17 DIAGNOSIS — G30.9 MODERATE ALZHEIMER'S DEMENTIA WITHOUT BEHAVIORAL DISTURBANCE, PSYCHOTIC DISTURBANCE, MOOD DISTURBANCE, OR ANXIETY, UNSPECIFIED TIMING OF DEMENTIA ONSET: ICD-10-CM

## 2023-12-17 LAB
ALBUMIN SERPL BCP-MCNC: 3.2 G/DL (ref 3.5–5.2)
ALP SERPL-CCNC: 77 U/L (ref 55–135)
ALT SERPL W/O P-5'-P-CCNC: 78 U/L (ref 10–44)
ANION GAP SERPL CALC-SCNC: 8 MMOL/L (ref 8–16)
AST SERPL-CCNC: 48 U/L (ref 10–40)
BASOPHILS # BLD AUTO: 0.02 K/UL (ref 0–0.2)
BASOPHILS NFR BLD: 0.2 % (ref 0–1.9)
BILIRUB SERPL-MCNC: 1.5 MG/DL (ref 0.1–1)
BNP SERPL-MCNC: 104 PG/ML (ref 0–99)
BUN SERPL-MCNC: 18 MG/DL (ref 10–30)
CALCIUM SERPL-MCNC: 8.6 MG/DL (ref 8.7–10.5)
CHLORIDE SERPL-SCNC: 104 MMOL/L (ref 95–110)
CO2 SERPL-SCNC: 29 MMOL/L (ref 23–29)
CREAT SERPL-MCNC: 0.9 MG/DL (ref 0.5–1.4)
DIFFERENTIAL METHOD: ABNORMAL
EOSINOPHIL # BLD AUTO: 0.1 K/UL (ref 0–0.5)
EOSINOPHIL NFR BLD: 0.8 % (ref 0–8)
ERYTHROCYTE [DISTWIDTH] IN BLOOD BY AUTOMATED COUNT: 12.8 % (ref 11.5–14.5)
EST. GFR  (NO RACE VARIABLE): >60 ML/MIN/1.73 M^2
GLUCOSE SERPL-MCNC: 149 MG/DL (ref 70–110)
HCT VFR BLD AUTO: 42 % (ref 40–54)
HGB BLD-MCNC: 13.8 G/DL (ref 14–18)
IMM GRANULOCYTES # BLD AUTO: 0.02 K/UL (ref 0–0.04)
IMM GRANULOCYTES NFR BLD AUTO: 0.2 % (ref 0–0.5)
LYMPHOCYTES # BLD AUTO: 0.9 K/UL (ref 1–4.8)
LYMPHOCYTES NFR BLD: 9.9 % (ref 18–48)
MCH RBC QN AUTO: 29.8 PG (ref 27–31)
MCHC RBC AUTO-ENTMCNC: 32.9 G/DL (ref 32–36)
MCV RBC AUTO: 91 FL (ref 82–98)
MONOCYTES # BLD AUTO: 0.6 K/UL (ref 0.3–1)
MONOCYTES NFR BLD: 6.3 % (ref 4–15)
NEUTROPHILS # BLD AUTO: 7.4 K/UL (ref 1.8–7.7)
NEUTROPHILS NFR BLD: 82.6 % (ref 38–73)
NRBC BLD-RTO: 0 /100 WBC
PLATELET # BLD AUTO: 134 K/UL (ref 150–450)
PMV BLD AUTO: 11.9 FL (ref 9.2–12.9)
POTASSIUM SERPL-SCNC: 4.3 MMOL/L (ref 3.5–5.1)
PROT SERPL-MCNC: 7.1 G/DL (ref 6–8.4)
RBC # BLD AUTO: 4.63 M/UL (ref 4.6–6.2)
SODIUM SERPL-SCNC: 141 MMOL/L (ref 136–145)
TROPONIN I SERPL DL<=0.01 NG/ML-MCNC: <0.006 NG/ML (ref 0–0.03)
WBC # BLD AUTO: 8.96 K/UL (ref 3.9–12.7)

## 2023-12-17 PROCEDURE — 93005 ELECTROCARDIOGRAM TRACING: CPT

## 2023-12-17 PROCEDURE — 83880 ASSAY OF NATRIURETIC PEPTIDE: CPT | Performed by: EMERGENCY MEDICINE

## 2023-12-17 PROCEDURE — 93010 ELECTROCARDIOGRAM REPORT: CPT | Mod: ,,, | Performed by: INTERNAL MEDICINE

## 2023-12-17 PROCEDURE — 84484 ASSAY OF TROPONIN QUANT: CPT | Performed by: EMERGENCY MEDICINE

## 2023-12-17 PROCEDURE — 82962 GLUCOSE BLOOD TEST: CPT

## 2023-12-17 PROCEDURE — 80053 COMPREHEN METABOLIC PANEL: CPT | Performed by: EMERGENCY MEDICINE

## 2023-12-17 PROCEDURE — 99285 EMERGENCY DEPT VISIT HI MDM: CPT | Mod: 25

## 2023-12-17 PROCEDURE — 85025 COMPLETE CBC W/AUTO DIFF WBC: CPT | Performed by: EMERGENCY MEDICINE

## 2023-12-17 PROCEDURE — 51798 US URINE CAPACITY MEASURE: CPT

## 2023-12-17 PROCEDURE — 93010 EKG 12-LEAD: ICD-10-PCS | Mod: ,,, | Performed by: INTERNAL MEDICINE

## 2023-12-17 NOTE — ED PROVIDER NOTES
SCRIBE #1 NOTE: IJayce, sherrie scribing for, and in the presence of, Claudia Randhawa MD. I have scribed the entire note.       History     Chief Complaint   Patient presents with    Altered Mental Status     [facility] PT; Per [facility], pt with AMS and right sided facial droop this morning at 0700. LKW 2000 last night. Hx of CVA with right sided facial droop at baseline. .     Review of patient's allergies indicates:  No Known Allergies      History of Present Illness     HPI    Limited HPI and ROS due secondary to pt's dementia    12/17/2023, 9:02 AM  History obtained from the patient, caretaker, and daughter-in-law      History of Present Illness: Kwame Vásquez Jr. is a 89 y.o. male patient with a PMHx of hypothyroidism and stroke who presents to the Emergency Department for evaluation of AMS which onset approximately 7:00 AM this morning upon waking with increased confusion and drooling. Caretaker who visits pt daily called for an ambulance. [facility] was contacted en route. Pt was recently switched from Benadryl to scopolamine by PA Thursday of this past week. Pt's LKW was last night prior to first scopolamine patch placement. Caretaker noticed pt was lethargic after patch placement before going to bed. Pt has had a similar reaction to scopolamine in the past per caretaker and daughter in law. Pt's right-sided facial droop is baseline from previous 2 strokes of the left hemisphere. Symptoms are constant and moderate in severity. No mitigating or exacerbating factors reported. No additional associated sxs. No prior Tx. No further complaints or concerns at this time.       Arrival mode: [facility]    PCP: Pooja Jimenez MD        Past Medical History:  Past Medical History:   Diagnosis Date    Glaucoma     Hypothyroidism        Past Surgical History:  Past Surgical History:   Procedure Laterality Date    CHOLECYSTECTOMY      Late 80's    ENUCLEATION Right     TONSILLECTOMY           Family  History:  Family History   Problem Relation Age of Onset    No Known Problems Mother     No Known Problems Father        Social History:  Social History     Tobacco Use    Smoking status: Never     Passive exposure: Never    Smokeless tobacco: Never   Substance and Sexual Activity    Alcohol use: Never    Drug use: Never    Sexual activity: Not on file        Review of Systems     Review of Systems   Unable to perform ROS: Dementia   HENT:  Positive for drooling.    Neurological:         (+) Lethargy   Psychiatric/Behavioral:  Positive for confusion.         Physical Exam     Initial Vitals [12/17/23 0829]   BP Pulse Resp Temp SpO2   130/84 63 16 97.8 °F (36.6 °C) (!) 94 %      MAP       --          Physical Exam  Nursing Notes and Vital Signs Reviewed.  Constitutional: Patient is in no acute distress. Elderly.  Head: Atraumatic. Normocephalic.  Eyes: PERRL. EOM intact. Conjunctivae are not pale. No scleral icterus.  ENT: Mucous membranes are moist. Oropharynx is clear and symmetric.    Neck: Supple. Full ROM. No lymphadenopathy.  Cardiovascular: Regular rate. Regular rhythm. No murmurs, rubs, or gallops. Distal pulses are 2+ and symmetric.  Pulmonary/Chest: No respiratory distress. Clear to auscultation bilaterally. No wheezing or rales.  Abdominal: Soft and non-distended.  There is no tenderness.  No rebound, guarding, or rigidity. Good bowel sounds.  Genitourinary: No CVA tenderness  Musculoskeletal: Moves all extremities. No obvious deformities. No edema. No calf tenderness.   Skin: Warm and dry.  Neurological:  Alert, awake, and oriented x3. Not oriented to time.  Normal speech.  Mild right-sided facial droop. No protonator drift. Equal  strength bilaterally.  Psychiatric: Normal affect. Good eye contact. Appropriate in content.     ED Course   Procedures  ED Vital Signs:  Vitals:    12/17/23 0829 12/17/23 0845 12/17/23 0900 12/17/23 1017   BP: 130/84  (!) 155/67 (!) 153/64   Pulse: 63  (!) 52 (!) 49    Resp: 16   (!) 22   Temp: 97.8 °F (36.6 °C)      TempSrc: Oral      SpO2: (!) 94%  96% (!) 94%   Weight:  70.6 kg (155 lb 10.3 oz)      12/17/23 1030 12/17/23 1118 12/17/23 1130   BP: (!) 155/66 (!) 126/97 (!) 160/93   Pulse: (!) 49 (!) 49 (!) 48   Resp: (!) 29 (!) 22 (!) 28   Temp:      TempSrc:      SpO2: 97% (!) 85% 96%   Weight:          Abnormal Lab Results:  Labs Reviewed   CBC W/ AUTO DIFFERENTIAL - Abnormal; Notable for the following components:       Result Value    Hemoglobin 13.8 (*)     Platelets 134 (*)     Lymph # 0.9 (*)     Gran % 82.6 (*)     Lymph % 9.9 (*)     All other components within normal limits   COMPREHENSIVE METABOLIC PANEL - Abnormal; Notable for the following components:    Glucose 149 (*)     Calcium 8.6 (*)     Albumin 3.2 (*)     Total Bilirubin 1.5 (*)     AST 48 (*)     ALT 78 (*)     All other components within normal limits   B-TYPE NATRIURETIC PEPTIDE - Abnormal; Notable for the following components:     (*)     All other components within normal limits   POCT GLUCOSE - Abnormal; Notable for the following components:    POCT Glucose 140 (*)     All other components within normal limits   TROPONIN I        All Lab Results:  Results for orders placed or performed during the hospital encounter of 12/17/23   CBC auto differential   Result Value Ref Range    WBC 8.96 3.90 - 12.70 K/uL    RBC 4.63 4.60 - 6.20 M/uL    Hemoglobin 13.8 (L) 14.0 - 18.0 g/dL    Hematocrit 42.0 40.0 - 54.0 %    MCV 91 82 - 98 fL    MCH 29.8 27.0 - 31.0 pg    MCHC 32.9 32.0 - 36.0 g/dL    RDW 12.8 11.5 - 14.5 %    Platelets 134 (L) 150 - 450 K/uL    MPV 11.9 9.2 - 12.9 fL    Immature Granulocytes 0.2 0.0 - 0.5 %    Gran # (ANC) 7.4 1.8 - 7.7 K/uL    Immature Grans (Abs) 0.02 0.00 - 0.04 K/uL    Lymph # 0.9 (L) 1.0 - 4.8 K/uL    Mono # 0.6 0.3 - 1.0 K/uL    Eos # 0.1 0.0 - 0.5 K/uL    Baso # 0.02 0.00 - 0.20 K/uL    nRBC 0 0 /100 WBC    Gran % 82.6 (H) 38.0 - 73.0 %    Lymph % 9.9 (L) 18.0 - 48.0 %     Mono % 6.3 4.0 - 15.0 %    Eosinophil % 0.8 0.0 - 8.0 %    Basophil % 0.2 0.0 - 1.9 %    Differential Method Automated    Comprehensive metabolic panel   Result Value Ref Range    Sodium 141 136 - 145 mmol/L    Potassium 4.3 3.5 - 5.1 mmol/L    Chloride 104 95 - 110 mmol/L    CO2 29 23 - 29 mmol/L    Glucose 149 (H) 70 - 110 mg/dL    BUN 18 10 - 30 mg/dL    Creatinine 0.9 0.5 - 1.4 mg/dL    Calcium 8.6 (L) 8.7 - 10.5 mg/dL    Total Protein 7.1 6.0 - 8.4 g/dL    Albumin 3.2 (L) 3.5 - 5.2 g/dL    Total Bilirubin 1.5 (H) 0.1 - 1.0 mg/dL    Alkaline Phosphatase 77 55 - 135 U/L    AST 48 (H) 10 - 40 U/L    ALT 78 (H) 10 - 44 U/L    eGFR >60 >60 mL/min/1.73 m^2    Anion Gap 8 8 - 16 mmol/L   Troponin I #1   Result Value Ref Range    Troponin I <0.006 0.000 - 0.026 ng/mL   BNP   Result Value Ref Range     (H) 0 - 99 pg/mL   POCT glucose   Result Value Ref Range    POCT Glucose 140 (H) 70 - 110 mg/dL         Imaging Results:  Imaging Results              CT Head Without Contrast (Final result)  Result time 12/17/23 08:53:12      Final result by Dario Lee MD (12/17/23 08:53:12)                   Impression:      Stable head CT.  Remote left cerebellar infarct with encephalomalacia.  Advanced cerebral atrophy.  No bleed.    All CT scans at this facility are performed  using dose modulation techniques as appropriate to performed exam including the following:  automated exposure control; adjustment of mA and/or kV according to the patients size (this includes techniques or standardized protocols for targeted exams where dose is matched to indication/reason for exam: i.e. extremities or head);  iterative reconstruction technique.      Electronically signed by: Dario Lee MD  Date:    12/17/2023  Time:    08:53               Narrative:    EXAMINATION:  CT HEAD WITHOUT CONTRAST    CLINICAL HISTORY:  Neuro deficit, acute, stroke suspected; weakness.    TECHNIQUE:  Routine noncontrast head  CT.    COMPARISON:  03/30/2023.    FINDINGS:  No change    There is advanced cerebral atrophy with ventricular-sulcal congruence.  Stable ventricles.    There is no acute intracranial hemorrhage or abnormal extra-axial fluid collection.    Left cerebellar encephalomalacia is again noted related to remote infarct.  There is no additional abnormal increased or decreased density within the brain parenchyma.  Gray-white differentiation is otherwise well preserved.  There is no intracranial mass or mass effect.    The calvarium is intact.    The visualized paranasal sinuses and mastoid air cells are well aerated.    Right ocular prosthesis.    ASPECTS score 10/10.                                       X-Ray Chest AP Portable (Final result)  Result time 12/17/23 08:43:08      Final result by Dario Lee MD (12/17/23 08:43:08)                   Impression:      Stable chest x-ray.      Electronically signed by: Dario Lee MD  Date:    12/17/2023  Time:    08:43               Narrative:    EXAMINATION:  XR CHEST AP PORTABLE    CLINICAL HISTORY:  weakness;    TECHNIQUE:  Single frontal view of the chest was performed.    COMPARISON:  None    FINDINGS:  Comparison study 03/30/2023.  No change.  Shallow breath.  Normal size heart.  No congestion.  Lungs are clear.  Degenerative spine and shoulders.                                       The EKG was ordered, reviewed, and independently interpreted by the ED provider.  Interpretation time: 8:59  Rate: 49 BPM  Rhythm: sinus bradycardia with 1st degree AV block  Interpretation: Left axis deviation. RBBB. No STEMI.  When compared to EKG performed 3/30/23, there are no significant changes.           The Emergency Provider reviewed the vital signs and test results, which are outlined above.     ED Discussion   Family reports patient given scopolamine patch last night for first time, was difficult to arouse this morning. Patch removed by care taker, now patient at baseline.  "    11:20 AM: Reassessed pt at this time.  Discussed with pt all pertinent ED information and results. Discussed pt dx and plan of tx. Gave pt all f/u and return to the ED instructions. All questions and concerns were addressed at this time. Pt expresses understanding of information and instructions, and is comfortable with plan to discharge. Pt is stable for discharge.    I discussed with patient and/or family/caretaker that evaluation in the ED does not suggest any emergent or life threatening medical conditions requiring immediate intervention beyond what was provided in the ED, and I believe patient is safe for discharge.  Regardless, an unremarkable evaluation in the ED does not preclude the development or presence of a serious of life threatening condition. As such, patient was instructed to return immediately for any worsening or change in current symptoms.        Medical Decision Making  DDX:  1. Medication side effect  2. STroke  3. Dehydration  4. Infection    Hx of dementia, stroke with residual right sided facial droop and weakness, presented with alt mental status "drooling" worsening right facial droop, however family now at bedside states feel like it was from patient being given scopolamine patch last night which was new for him, patch now removed, patient does have right facial droop on exam but no other focal deficits, CT head reviewed and chronic ischemic changes noted, ECG shows sinus bradycardia no acute ischemic changes, CXR and lab work otherwise stable and at baseline.  Offered admission to family/daughter in law at bedside, she declined and would prefer for patient to go home, I think this is reasonable.     Amount and/or Complexity of Data Reviewed  Independent Historian: caregiver and EMS     Details: And Daughter-in-law. See HPI.  Labs: ordered. Decision-making details documented in ED Course.  Radiology: ordered. Decision-making details documented in ED Course.  ECG/medicine tests: " ordered and independent interpretation performed. Decision-making details documented in ED Course.    Risk  Decision regarding hospitalization.                ED Medication(s):  Medications - No data to display    Discharge Medication List as of 12/17/2023 11:19 AM           Follow-up Information       Pooja Jimenez MD. Schedule an appointment as soon as possible for a visit in 2 days.    Specialty: Family Medicine  Why: Return to the Emergency Room, If symptoms worsen  Contact information:  [address] [place] [address]  [phone]                                 Scribe Attestation:   Scribe #1: I performed the above scribed service and the documentation accurately describes the services I performed. I attest to the accuracy of the note.     Attending:   Physician Attestation Statement for Scribe #1: I, Claudia Randhawa MD, personally performed the services described in this documentation, as scribed by Jayce Leyva, in my presence, and it is both accurate and complete.           Clinical Impression       ICD-10-CM ICD-9-CM   1. Medication side effect, initial encounter  T50.905A E947.9   2. Weakness  R53.1 780.79   3. 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

## 2023-12-18 LAB — POCT GLUCOSE: 140 MG/DL (ref 70–110)

## 2023-12-18 NOTE — PROGRESS NOTES
"Subjective:       Patient ID: Kwame Vásquez Jr. is a 89 y.o. male.    Chief Complaint: Medication Refill  Pt reports to clinic with daughter and caregiver.  [facility]  Sitter reports copious oral secretions.  Not able to swallowing and clear.  Denies choking with eating. No hoarseness.  Has been administering benadryl to help.  Also complains of worsening RLS, "constant movement" "kicks legs at night, awaken from sleep".  Pt denies symptoms are bothering him, caregiver reports interruption in sleep due to movement.  Caregiver also reports agitation and confusion during evening, with mild combative behavior.  "Forgets he has his pajama on, gets feisty".  Pt was previously, on hospice, but released due to stability.  Daughter inquires about re evaluation by [facility].     Past Medical History:   Diagnosis Date    Glaucoma     Hypothyroidism       Current Outpatient Medications on File Prior to Visit   Medication Sig Dispense Refill    azelastine (ASTELIN) 137 mcg (0.1 %) nasal spray 1 spray (137 mcg total) by Nasal route 2 (two) times daily. 30 mL 2    senna (SENOKOT) 8.6 mg tablet Take 1 tablet by mouth once daily.       No current facility-administered medications on file prior to visit.      Patient Active Problem List    Diagnosis Date Noted    Dysarthria and anarthria 03/30/2023    Traumatic closed displaced fracture of shaft of clavicle with delayed healing, right 05/05/2021    DM type 2 with diabetic dyslipidemia 10/31/2019    Hypothyroid 06/03/2019    Glaucoma 06/03/2019    Primary insomnia 10/28/2016    Senile debility 10/28/2016    Allergic rhinitis 04/02/2015    Irritable bowel syndrome 02/18/2014    BPH (benign prostatic hyperplasia) 05/01/2012    Hyperlipemia 05/01/2012      Medication Refill  This is a chronic problem. The current episode started more than 1 year ago. The problem has been unchanged. Associated symptoms include arthralgias and " weakness. Pertinent negatives include no coughing, diaphoresis, myalgias or nausea. He has tried nothing for the symptoms.     Review of Systems   Constitutional:  Positive for activity change. Negative for diaphoresis and unexpected weight change.   HENT:          Copious oral secretions   Respiratory:  Negative for cough, shortness of breath and wheezing.    Cardiovascular:  Negative for palpitations and leg swelling.   Gastrointestinal:  Negative for nausea.   Genitourinary:  Positive for frequency.   Musculoskeletal:  Positive for arthralgias. Negative for myalgias.   Neurological:  Positive for weakness.        Involuntary leg movement   Psychiatric/Behavioral:  Positive for agitation and confusion.        Objective:      Physical Exam  Vitals reviewed.   HENT:      Head: Normocephalic.      Right Ear: External ear normal.      Left Ear: External ear normal.   Eyes:      Extraocular Movements: Extraocular movements intact.   Cardiovascular:      Rate and Rhythm: Normal rate.      Heart sounds: Normal heart sounds.   Musculoskeletal:      Cervical back: Normal range of motion.      Comments: wheelchair   Neurological:      Mental Status: He is alert and oriented to person, place, and time.      Comments: Frequent leg movement; poor    Psychiatric:         Behavior: Behavior normal.         Assessment:       1. DM type 2 with diabetic dyslipidemia    2. Benign prostatic hyperplasia, unspecified whether lower urinary tract symptoms present    3. Hypothyroidism, unspecified type    4. Allergic rhinitis, unspecified seasonality, unspecified trigger    5. Chronic constipation    6. Sialorrhea    7. Need for vaccination    8. Age-related physical debility        Plan:   DM type 2 with diabetic dyslipidemia    Benign prostatic hyperplasia, unspecified whether lower urinary tract symptoms present  -     finasteride (PROSCAR) 5 mg tablet; Take 1 tablet (5 mg total) by mouth once daily.  Dispense: 90 tablet; Refill:  3  -     tamsulosin (FLOMAX) 0.4 mg Cap; Take 1 capsule (0.4 mg total) by mouth once daily.  Dispense: 90 capsule; Refill: 3    Hypothyroidism, unspecified type    Allergic rhinitis, unspecified seasonality, unspecified trigger    Chronic constipation    Sialorrhea  -scopolamine patch, side effects discussed  Need for vaccination  -     Influenza - Quadrivalent (Adjuvanted)    Age-related physical debility  -     Ambulatory referral/consult to Hospice; Future; Expected date: 12/24/2023  -wheelchair needs due to worsening physical debility.  Previously failed use to cane due to instability and physical weakness. Also worsening  PLMD prohibits ambulation.  Poor .      Mr. Sierra has a mobility limitation that significantly impairs her ability to participate in one or more mobility related activities of daily living (MRADL's) such as toileting, feeding, dressing, grooming, and bathing in customary locations in the home. The mobility limitation cannot be sufficiently resolved by the use of a cane or walker. The use of a manual wheelchair will significantly improve the patient's ability to participate in MRADLS and the patient will use it on regular basis in the home. Mr Sierra and caregiver has expressed her willingness to use a manual wheelchair in the home. he also has a caregiver who is available, willing, and able to provide assistance with the wheelchair when needed.   Other orders  -     atorvastatin (LIPITOR) 20 MG tablet; Take 1 tablet (20 mg total) by mouth once daily.  Dispense: 90 tablet; Refill: 3  -     levothyroxine (SYNTHROID) 88 MCG tablet; Take 1 tablet (88 mcg total) by mouth before breakfast.  Dispense: 90 tablet; Refill: 3  -     cetirizine (ZYRTEC) 10 MG tablet; Take 1 tablet (10 mg total) by mouth daily as needed for Allergies.  Dispense: 30 tablet; Refill: 3  -     lactulose (CHRONULAC) 20 gram/30 mL Soln; Take 15 mLs (10 g total) by mouth 2 (two) times daily as needed (constipation).  Dispense:  300 mL; Refill: 6  -     temazepam (RESTORIL) 30 mg capsule; TAKE ONE CAPSULE BY MOUTH AT BEDTIME FOR INSOMNIA  Dispense: 90 capsule; Refill: 1  -     aspirin (ECOTRIN) 81 MG EC tablet; Take 1 tablet (81 mg total) by mouth once daily.  Dispense: 90 tablet; Refill: 3  -     latanoprost 0.005 % ophthalmic solution; Place 1 drop into the left eye every evening.  Dispense: 2.5 mL; Refill: 6  -     brimonidine 0.2% (ALPHAGAN) 0.2 % Drop; Place 1 drop into the left eye 2 (two) times daily.  Dispense: 15 mL; Refill: 4  -     erythromycin (ROMYCIN) ophthalmic ointment; Place into both eyes every evening.  Dispense: 3.5 g; Refill: 1  -     Discontinue: scopolamine (TRANSDERM-SCOP) 1.3-1.5 mg (1 mg over 3 days); Place 1 patch onto the skin Every 3 (three) days.  Dispense: 24 patch; Refill: 1    Order placed for hospice for re evaluation.  Discussed care with Dr. Mccallum regarding sundown and RLS, will begin low dose klonopin @ evening.  Caregiver informed not to administer within 2 hours of night time sleep aid  if oversedation occurs hold temazepam.      Time spent: 45 minutes in face to face discussion concerning diagnosis, prognosis, review of lab and test results, benefits of treatment as well as management of disease, counseling of patient and coordination of care between various health care providers . Greater than half the time spent was used for coordination of care and counseling of patient.   No follow-ups on file.

## 2024-01-26 ENCOUNTER — PATIENT MESSAGE (OUTPATIENT)
Dept: FAMILY MEDICINE | Facility: CLINIC | Age: 89
End: 2024-01-26
Payer: MEDICARE

## 2024-01-26 DIAGNOSIS — G45.9 TIA (TRANSIENT ISCHEMIC ATTACK): ICD-10-CM

## 2024-01-26 DIAGNOSIS — R54 AGE-RELATED PHYSICAL DEBILITY: Primary | ICD-10-CM

## 2024-01-26 DIAGNOSIS — R20.2 PARESTHESIA: ICD-10-CM

## 2024-02-23 ENCOUNTER — PATIENT MESSAGE (OUTPATIENT)
Dept: FAMILY MEDICINE | Facility: CLINIC | Age: 89
End: 2024-02-23
Payer: MEDICARE

## 2024-02-27 DIAGNOSIS — Z00.00 ENCOUNTER FOR MEDICARE ANNUAL WELLNESS EXAM: ICD-10-CM

## 2024-03-04 ENCOUNTER — PATIENT MESSAGE (OUTPATIENT)
Dept: FAMILY MEDICINE | Facility: CLINIC | Age: 89
End: 2024-03-04
Payer: MEDICARE

## 2024-03-16 ENCOUNTER — PATIENT MESSAGE (OUTPATIENT)
Dept: ADMINISTRATIVE | Facility: HOSPITAL | Age: 89
End: 2024-03-16
Payer: MEDICARE

## 2024-04-26 ENCOUNTER — LAB VISIT (OUTPATIENT)
Dept: LAB | Facility: HOSPITAL | Age: 89
End: 2024-04-26
Attending: NURSE PRACTITIONER
Payer: MEDICARE

## 2024-04-26 ENCOUNTER — TELEPHONE (OUTPATIENT)
Dept: FAMILY MEDICINE | Facility: CLINIC | Age: 89
End: 2024-04-26
Payer: MEDICARE

## 2024-04-26 DIAGNOSIS — E78.5 DM TYPE 2 WITH DIABETIC DYSLIPIDEMIA: Primary | ICD-10-CM

## 2024-04-26 DIAGNOSIS — E78.5 DM TYPE 2 WITH DIABETIC DYSLIPIDEMIA: ICD-10-CM

## 2024-04-26 DIAGNOSIS — E11.69 DM TYPE 2 WITH DIABETIC DYSLIPIDEMIA: Primary | ICD-10-CM

## 2024-04-26 DIAGNOSIS — E11.69 DM TYPE 2 WITH DIABETIC DYSLIPIDEMIA: ICD-10-CM

## 2024-04-26 LAB
BILIRUB UR QL STRIP: NEGATIVE
CLARITY UR REFRACT.AUTO: CLEAR
COLOR UR AUTO: YELLOW
GLUCOSE UR QL STRIP: NEGATIVE
HGB UR QL STRIP: NEGATIVE
KETONES UR QL STRIP: NEGATIVE
LEUKOCYTE ESTERASE UR QL STRIP: NEGATIVE
NITRITE UR QL STRIP: NEGATIVE
PH UR STRIP: 7 [PH] (ref 5–8)
PROT UR QL STRIP: NEGATIVE
SP GR UR STRIP: 1.02 (ref 1–1.03)
URN SPEC COLLECT METH UR: NORMAL

## 2024-04-26 PROCEDURE — 81003 URINALYSIS AUTO W/O SCOPE: CPT | Performed by: NURSE PRACTITIONER

## 2024-05-23 RX ORDER — CETIRIZINE HYDROCHLORIDE 10 MG/1
TABLET ORAL
Qty: 30 TABLET | Refills: 3 | Status: SHIPPED | OUTPATIENT
Start: 2024-05-23

## 2024-05-23 RX ORDER — TEMAZEPAM 30 MG/1
CAPSULE ORAL
Qty: 90 CAPSULE | Refills: 3 | Status: SHIPPED | OUTPATIENT
Start: 2024-05-23

## 2024-05-23 RX ORDER — CLONAZEPAM 0.5 MG/1
TABLET ORAL
Qty: 30 TABLET | Refills: 3 | Status: SHIPPED | OUTPATIENT
Start: 2024-05-23

## 2024-06-05 ENCOUNTER — PATIENT MESSAGE (OUTPATIENT)
Dept: FAMILY MEDICINE | Facility: CLINIC | Age: 89
End: 2024-06-05
Payer: MEDICARE

## 2024-06-14 ENCOUNTER — OFFICE VISIT (OUTPATIENT)
Dept: FAMILY MEDICINE | Facility: CLINIC | Age: 89
End: 2024-06-14
Payer: MEDICARE

## 2024-06-14 DIAGNOSIS — D69.6 THROMBOCYTOPENIA, UNSPECIFIED: ICD-10-CM

## 2024-06-14 DIAGNOSIS — E11.69 DM TYPE 2 WITH DIABETIC DYSLIPIDEMIA: ICD-10-CM

## 2024-06-14 DIAGNOSIS — F02.B0 MODERATE ALZHEIMER'S DEMENTIA WITHOUT BEHAVIORAL DISTURBANCE, PSYCHOTIC DISTURBANCE, MOOD DISTURBANCE, OR ANXIETY, UNSPECIFIED TIMING OF DEMENTIA ONSET: Primary | ICD-10-CM

## 2024-06-14 DIAGNOSIS — I70.0 AORTIC ATHEROSCLEROSIS: ICD-10-CM

## 2024-06-14 DIAGNOSIS — G30.9 MODERATE ALZHEIMER'S DEMENTIA WITHOUT BEHAVIORAL DISTURBANCE, PSYCHOTIC DISTURBANCE, MOOD DISTURBANCE, OR ANXIETY, UNSPECIFIED TIMING OF DEMENTIA ONSET: Primary | ICD-10-CM

## 2024-06-14 DIAGNOSIS — E78.5 DM TYPE 2 WITH DIABETIC DYSLIPIDEMIA: ICD-10-CM

## 2024-06-14 PROCEDURE — 99213 OFFICE O/P EST LOW 20 MIN: CPT | Mod: 95,,, | Performed by: NURSE PRACTITIONER

## 2024-06-18 ENCOUNTER — CLINICAL SUPPORT (OUTPATIENT)
Dept: FAMILY MEDICINE | Facility: CLINIC | Age: 89
End: 2024-06-18
Payer: MEDICARE

## 2024-06-18 ENCOUNTER — HOSPITAL ENCOUNTER (OUTPATIENT)
Dept: RADIOLOGY | Facility: HOSPITAL | Age: 89
Discharge: HOME | End: 2024-06-18
Attending: NURSE PRACTITIONER
Payer: MEDICARE

## 2024-06-18 DIAGNOSIS — Z11.9 SCREENING EXAMINATION FOR INFECTIOUS DISEASE: ICD-10-CM

## 2024-06-18 DIAGNOSIS — Z11.9 SCREENING EXAMINATION FOR INFECTIOUS DISEASE: Primary | ICD-10-CM

## 2024-06-18 PROCEDURE — 86580 TB INTRADERMAL TEST: CPT | Mod: PBBFAC,PO

## 2024-06-18 PROCEDURE — 71046 X-RAY EXAM CHEST 2 VIEWS: CPT | Mod: TC,PO

## 2024-06-18 PROCEDURE — 99999PBSHW POCT TB SKIN TEST: Mod: PBBFAC,,,

## 2024-06-18 PROCEDURE — 71046 X-RAY EXAM CHEST 2 VIEWS: CPT | Mod: 26,,, | Performed by: RADIOLOGY

## 2024-06-18 PROCEDURE — 99999 PR PBB SHADOW E&M-EST. PATIENT-LVL II: CPT | Mod: PBBFAC,,,

## 2024-06-18 PROCEDURE — 99212 OFFICE O/P EST SF 10 MIN: CPT | Mod: PBBFAC,25,PO

## 2024-06-20 ENCOUNTER — TELEPHONE (OUTPATIENT)
Dept: FAMILY MEDICINE | Facility: CLINIC | Age: 89
End: 2024-06-20
Payer: MEDICARE

## 2024-06-20 NOTE — TELEPHONE ENCOUNTER
Spoke to pt daughter and she stated that the nurse at the [facility] that the pt is staying at told her to not being him back up here to get his tb test read and told her that she would read. The nurse told daughter that it was negative.

## 2024-07-01 ENCOUNTER — HOSPITAL ENCOUNTER (EMERGENCY)
Facility: HOSPITAL | Age: 89
Discharge: HOME | End: 2024-07-01
Attending: EMERGENCY MEDICINE | Admitting: INTERNAL MEDICINE
Payer: MEDICARE

## 2024-07-01 VITALS
SYSTOLIC BLOOD PRESSURE: 161 MMHG | DIASTOLIC BLOOD PRESSURE: 74 MMHG | OXYGEN SATURATION: 98 % | HEART RATE: 84 BPM | TEMPERATURE: 98 F | RESPIRATION RATE: 20 BRPM

## 2024-07-01 DIAGNOSIS — E86.0 DEHYDRATION: ICD-10-CM

## 2024-07-01 DIAGNOSIS — R07.9 CHEST PAIN: ICD-10-CM

## 2024-07-01 LAB
ALBUMIN SERPL BCP-MCNC: 3.3 G/DL (ref 3.5–5.2)
ALP SERPL-CCNC: 88 U/L (ref 55–135)
ALT SERPL W/O P-5'-P-CCNC: 52 U/L (ref 10–44)
ANION GAP SERPL CALC-SCNC: 13 MMOL/L (ref 8–16)
AST SERPL-CCNC: 63 U/L (ref 10–40)
BACTERIA #/AREA URNS HPF: ABNORMAL /HPF
BASOPHILS # BLD AUTO: 0.02 K/UL (ref 0–0.2)
BASOPHILS NFR BLD: 0.1 % (ref 0–1.9)
BILIRUB SERPL-MCNC: 2.6 MG/DL (ref 0.1–1)
BILIRUB UR QL STRIP: NEGATIVE
BUN SERPL-MCNC: 32 MG/DL (ref 10–30)
CALCIUM SERPL-MCNC: 9.2 MG/DL (ref 8.7–10.5)
CHLORIDE SERPL-SCNC: 104 MMOL/L (ref 95–110)
CLARITY UR: CLEAR
CO2 SERPL-SCNC: 27 MMOL/L (ref 23–29)
COLOR UR: YELLOW
CREAT SERPL-MCNC: 0.9 MG/DL (ref 0.5–1.4)
DIFFERENTIAL METHOD BLD: ABNORMAL
EOSINOPHIL # BLD AUTO: 0 K/UL (ref 0–0.5)
EOSINOPHIL NFR BLD: 0.1 % (ref 0–8)
ERYTHROCYTE [DISTWIDTH] IN BLOOD BY AUTOMATED COUNT: 13.1 % (ref 11.5–14.5)
EST. GFR  (NO RACE VARIABLE): >60 ML/MIN/1.73 M^2
GLUCOSE SERPL-MCNC: 167 MG/DL (ref 70–110)
GLUCOSE UR QL STRIP: NEGATIVE
HCT VFR BLD AUTO: 43.2 % (ref 40–54)
HCV AB SERPL QL IA: NEGATIVE
HEP C VIRUS HOLD SPECIMEN: NORMAL
HGB BLD-MCNC: 14.2 G/DL (ref 14–18)
HGB UR QL STRIP: ABNORMAL
HIV 1+2 AB+HIV1 P24 AG SERPL QL IA: NEGATIVE
HYALINE CASTS #/AREA URNS LPF: 0 /LPF
IMM GRANULOCYTES # BLD AUTO: 0.09 K/UL (ref 0–0.04)
IMM GRANULOCYTES NFR BLD AUTO: 0.6 % (ref 0–0.5)
KETONES UR QL STRIP: ABNORMAL
LEUKOCYTE ESTERASE UR QL STRIP: NEGATIVE
LYMPHOCYTES # BLD AUTO: 0.9 K/UL (ref 1–4.8)
LYMPHOCYTES NFR BLD: 6.4 % (ref 18–48)
MCH RBC QN AUTO: 29.7 PG (ref 27–31)
MCHC RBC AUTO-ENTMCNC: 32.9 G/DL (ref 32–36)
MCV RBC AUTO: 90 FL (ref 82–98)
MICROSCOPIC COMMENT: ABNORMAL
MONOCYTES # BLD AUTO: 0.8 K/UL (ref 0.3–1)
MONOCYTES NFR BLD: 5.5 % (ref 4–15)
NEUTROPHILS # BLD AUTO: 12.3 K/UL (ref 1.8–7.7)
NEUTROPHILS NFR BLD: 87.3 % (ref 38–73)
NITRITE UR QL STRIP: NEGATIVE
NRBC BLD-RTO: 0 /100 WBC
PH UR STRIP: 6 [PH] (ref 5–8)
PLATELET # BLD AUTO: 196 K/UL (ref 150–450)
PMV BLD AUTO: 11.9 FL (ref 9.2–12.9)
POTASSIUM SERPL-SCNC: 3.8 MMOL/L (ref 3.5–5.1)
PROT SERPL-MCNC: 7.4 G/DL (ref 6–8.4)
PROT UR QL STRIP: ABNORMAL
RBC # BLD AUTO: 4.78 M/UL (ref 4.6–6.2)
RBC #/AREA URNS HPF: >100 /HPF (ref 0–4)
SODIUM SERPL-SCNC: 144 MMOL/L (ref 136–145)
SP GR UR STRIP: 1.03 (ref 1–1.03)
URN SPEC COLLECT METH UR: ABNORMAL
UROBILINOGEN UR STRIP-ACNC: ABNORMAL EU/DL
WBC # BLD AUTO: 14.1 K/UL (ref 3.9–12.7)
WBC #/AREA URNS HPF: 0 /HPF (ref 0–5)

## 2024-07-01 PROCEDURE — 96360 HYDRATION IV INFUSION INIT: CPT

## 2024-07-01 PROCEDURE — 85025 COMPLETE CBC W/AUTO DIFF WBC: CPT | Performed by: EMERGENCY MEDICINE

## 2024-07-01 PROCEDURE — 86803 HEPATITIS C AB TEST: CPT | Performed by: EMERGENCY MEDICINE

## 2024-07-01 PROCEDURE — 96361 HYDRATE IV INFUSION ADD-ON: CPT

## 2024-07-01 PROCEDURE — 80053 COMPREHEN METABOLIC PANEL: CPT | Performed by: EMERGENCY MEDICINE

## 2024-07-01 PROCEDURE — 87389 HIV-1 AG W/HIV-1&-2 AB AG IA: CPT | Performed by: EMERGENCY MEDICINE

## 2024-07-01 PROCEDURE — 81000 URINALYSIS NONAUTO W/SCOPE: CPT | Performed by: EMERGENCY MEDICINE

## 2024-07-01 PROCEDURE — 25000003 PHARM REV CODE 250: Performed by: EMERGENCY MEDICINE

## 2024-07-01 PROCEDURE — 25000003 PHARM REV CODE 250: Performed by: INTERNAL MEDICINE

## 2024-07-01 PROCEDURE — 99285 EMERGENCY DEPT VISIT HI MDM: CPT | Mod: 25

## 2024-07-01 RX ORDER — SODIUM CHLORIDE 9 MG/ML
INJECTION, SOLUTION INTRAVENOUS CONTINUOUS
Status: DISCONTINUED | OUTPATIENT
Start: 2024-07-01 | End: 2024-07-01

## 2024-07-01 RX ORDER — BRIMONIDINE TARTRATE 2 MG/ML
1 SOLUTION/ DROPS OPHTHALMIC 2 TIMES DAILY
Status: DISCONTINUED | OUTPATIENT
Start: 2024-07-01 | End: 2024-07-01

## 2024-07-01 RX ORDER — GLUCAGON 1 MG
1 KIT INJECTION
Status: DISCONTINUED | OUTPATIENT
Start: 2024-07-01 | End: 2024-07-01

## 2024-07-01 RX ORDER — LATANOPROST 50 UG/ML
1 SOLUTION/ DROPS OPHTHALMIC NIGHTLY
Status: DISCONTINUED | OUTPATIENT
Start: 2024-07-01 | End: 2024-07-01

## 2024-07-01 RX ORDER — SENNOSIDES 8.6 MG/1
1 TABLET ORAL DAILY
Status: DISCONTINUED | OUTPATIENT
Start: 2024-07-02 | End: 2024-07-01

## 2024-07-01 RX ORDER — ONDANSETRON HYDROCHLORIDE 2 MG/ML
4 INJECTION, SOLUTION INTRAVENOUS EVERY 8 HOURS PRN
Status: DISCONTINUED | OUTPATIENT
Start: 2024-07-01 | End: 2024-07-01

## 2024-07-01 RX ORDER — FINASTERIDE 5 MG/1
5 TABLET, FILM COATED ORAL DAILY
Status: DISCONTINUED | OUTPATIENT
Start: 2024-07-02 | End: 2024-07-01

## 2024-07-01 RX ORDER — IBUPROFEN 200 MG
24 TABLET ORAL
Status: DISCONTINUED | OUTPATIENT
Start: 2024-07-01 | End: 2024-07-01

## 2024-07-01 RX ORDER — IBUPROFEN 200 MG
16 TABLET ORAL
Status: DISCONTINUED | OUTPATIENT
Start: 2024-07-01 | End: 2024-07-01

## 2024-07-01 RX ORDER — SODIUM CHLORIDE 0.9 % (FLUSH) 0.9 %
10 SYRINGE (ML) INJECTION EVERY 12 HOURS PRN
Status: DISCONTINUED | OUTPATIENT
Start: 2024-07-01 | End: 2024-07-01

## 2024-07-01 RX ORDER — CLONAZEPAM 0.5 MG/1
0.5 TABLET ORAL 2 TIMES DAILY PRN
Status: DISCONTINUED | OUTPATIENT
Start: 2024-07-01 | End: 2024-07-01

## 2024-07-01 RX ORDER — LEVOTHYROXINE SODIUM 88 UG/1
88 TABLET ORAL
Status: DISCONTINUED | OUTPATIENT
Start: 2024-07-02 | End: 2024-07-01

## 2024-07-01 RX ORDER — NALOXONE HCL 0.4 MG/ML
0.02 VIAL (ML) INJECTION
Status: DISCONTINUED | OUTPATIENT
Start: 2024-07-01 | End: 2024-07-01

## 2024-07-01 RX ORDER — ASPIRIN 81 MG/1
81 TABLET ORAL DAILY
Status: DISCONTINUED | OUTPATIENT
Start: 2024-07-02 | End: 2024-07-01

## 2024-07-01 RX ORDER — CETIRIZINE HYDROCHLORIDE 10 MG/1
10 TABLET ORAL DAILY PRN
Status: DISCONTINUED | OUTPATIENT
Start: 2024-07-01 | End: 2024-07-01

## 2024-07-01 RX ORDER — ENOXAPARIN SODIUM 100 MG/ML
40 INJECTION SUBCUTANEOUS EVERY 24 HOURS
Status: DISCONTINUED | OUTPATIENT
Start: 2024-07-01 | End: 2024-07-01

## 2024-07-01 RX ORDER — PROCHLORPERAZINE EDISYLATE 5 MG/ML
5 INJECTION INTRAMUSCULAR; INTRAVENOUS EVERY 6 HOURS PRN
Status: DISCONTINUED | OUTPATIENT
Start: 2024-07-01 | End: 2024-07-01

## 2024-07-01 RX ORDER — POLYETHYLENE GLYCOL 3350 17 G/17G
17 POWDER, FOR SOLUTION ORAL DAILY PRN
Status: DISCONTINUED | OUTPATIENT
Start: 2024-07-01 | End: 2024-07-01

## 2024-07-01 RX ORDER — TAMSULOSIN HYDROCHLORIDE 0.4 MG/1
1 CAPSULE ORAL DAILY
Status: DISCONTINUED | OUTPATIENT
Start: 2024-07-02 | End: 2024-07-01

## 2024-07-01 RX ORDER — ACETAMINOPHEN 325 MG/1
650 TABLET ORAL EVERY 4 HOURS PRN
Status: DISCONTINUED | OUTPATIENT
Start: 2024-07-01 | End: 2024-07-01

## 2024-07-01 RX ADMIN — SODIUM CHLORIDE 1000 ML: 9 INJECTION, SOLUTION INTRAVENOUS at 01:07

## 2024-07-01 RX ADMIN — SODIUM CHLORIDE 1000 ML: 9 INJECTION, SOLUTION INTRAVENOUS at 05:07

## 2024-07-01 NOTE — Clinical Note
Diagnosis: Dehydration [276.51.ICD-9-CM]   Future Attending Provider: THAO ALCOCER [[id number]]   Special Needs:: Fall Risk [15]   Special Needs:: Disoriented [14]   Special Needs:: Sitter Needed [24]

## 2024-07-01 NOTE — ASSESSMENT & PLAN NOTE
Patient with dementia with likely etiology of alzheimer's dementia. Dementia is severe. The patient does have signs of behavioral disturbance.   PT with reports of decreased PO intake, refusal of medications and decreased UOP over last week per ER records  Per review of NH records, pt is DNR and pt's surrogate decision maker is listed as Daughter in law  Seda Sierra [phone]

## 2024-07-01 NOTE — ASSESSMENT & PLAN NOTE
Pt family does not want him to be admitted at this time, request additional fluids in the ED and discharge back to nursing home so that patient can be at home.   Discussed with ER provider Dr. Shaw

## 2024-07-01 NOTE — SUBJECTIVE & OBJECTIVE
Past Medical History:   Diagnosis Date    Glaucoma     Hypothyroidism        Past Surgical History:   Procedure Laterality Date    CHOLECYSTECTOMY      Late 80's    ENUCLEATION Right     TONSILLECTOMY         Review of patient's allergies indicates:  No Known Allergies    No current facility-administered medications on file prior to encounter.     Current Outpatient Medications on File Prior to Encounter   Medication Sig    aspirin (ECOTRIN) 81 MG EC tablet Take 1 tablet (81 mg total) by mouth once daily.    atorvastatin (LIPITOR) 20 MG tablet Take 1 tablet (20 mg total) by mouth once daily.    azelastine (ASTELIN) 137 mcg (0.1 %) nasal spray 1 spray (137 mcg total) by Nasal route 2 (two) times daily.    brimonidine 0.2% (ALPHAGAN) 0.2 % Drop Place 1 drop into the left eye 2 (two) times daily.    cetirizine (ZYRTEC) 10 MG tablet TAKE ONE TABLET BY MOUTH EVERY DAY AS NEEDED FOR ALLERGIES    clonazePAM (KLONOPIN) 0.5 MG tablet TAKE ONE TABLET BY MOUTH EVERY DAY IN AFTERNOON FOR AGITATION    erythromycin (ROMYCIN) ophthalmic ointment Place into both eyes every evening.    finasteride (PROSCAR) 5 mg tablet Take 1 tablet (5 mg total) by mouth once daily.    lactulose (CHRONULAC) 20 gram/30 mL Soln Take 15 mLs (10 g total) by mouth 2 (two) times daily as needed (constipation).    latanoprost 0.005 % ophthalmic solution Place 1 drop into the left eye every evening.    levothyroxine (SYNTHROID) 88 MCG tablet Take 1 tablet (88 mcg total) by mouth before breakfast.    senna (SENOKOT) 8.6 mg tablet Take 1 tablet by mouth once daily.    tamsulosin (FLOMAX) 0.4 mg Cap Take 1 capsule (0.4 mg total) by mouth once daily.    temazepam (RESTORIL) 30 mg capsule TAKE ONE CAPSULE BY MOUTH AT BEDTIME FOR INSOMNIA     Family History       Problem Relation (Age of Onset)    No Known Problems Mother, Father          Tobacco Use    Smoking status: Never     Passive exposure: Never    Smokeless tobacco: Never   Substance and Sexual Activity     Alcohol use: Never    Drug use: Never    Sexual activity: Not on file     Review of Systems   Unable to perform ROS: Dementia     Objective:     Vital Signs (Most Recent):  Temp: 98.2 °F (36.8 °C) (07/01/24 1135)  Pulse: 71 (07/01/24 1505)  Resp: (!) 21 (07/01/24 1320)  BP: 125/80 (07/01/24 1505)  SpO2: 96 % (07/01/24 1505) Vital Signs (24h Range):  Temp:  [98.2 °F (36.8 °C)] 98.2 °F (36.8 °C)  Pulse:  [63-75] 71  Resp:  [18-21] 21  SpO2:  [96 %-97 %] 96 %  BP: (122-155)/(59-83) 125/80        There is no height or weight on file to calculate BMI.     Physical Exam  Vitals and nursing note reviewed. Exam conducted with a chaperone present.   Constitutional:       Comments: Frail, chronically ill appearing, disoriented    HENT:      Mouth/Throat:      Mouth: Mucous membranes are dry.   Cardiovascular:      Rate and Rhythm: Normal rate and regular rhythm.   Pulmonary:      Effort: Pulmonary effort is normal. No respiratory distress.      Breath sounds: No wheezing.      Comments: Sats 94-95% on room air   Abdominal:      General: Bowel sounds are normal. There is no distension.      Palpations: Abdomen is soft.      Tenderness: There is no abdominal tenderness.   Genitourinary:     Comments: deferred  Musculoskeletal:      Right lower leg: No edema.      Left lower leg: No edema.   Neurological:      Mental Status: He is disoriented.          Significant Labs: All pertinent labs within the past 24 hours have been reviewed.    Significant Imaging: I have reviewed all pertinent imaging results/findings within the past 24 hours.

## 2024-07-01 NOTE — HPI
Pt is a 89 YO  male with PMH Notable for dementia, hypothyroidism, prior CVA, BPH, HLD who presented to the ED from nursing home for 1 week hx of decreased urine output, worsening confusion and decreased PO intake. In the ED, initial VS: temp afebrile, HR 63, /60, sats 96% on room air. Work up notable for: WBC 14, BMP with BUN 32, Cr 0.9 (stable from prev), UA + ketones, 3+ blood but neg for UTI. CT head neg for acute changes. PT received 1L NS in the ED. Bladder scan showed only 268 ml of urine, s/p straight cath in the ED. Hospital medicine was consulted for admission for observation and management of dehydration.     Pt seen in ED with daughter in law Seda at bedside. She states that she would prefer for patient to receive additional fluids in the ED and then be discharged back to nursing home. She does not want him to be admitted to the hospital. This was discussed with ED provider Dr. Shaw.

## 2024-07-01 NOTE — ED PROVIDER NOTES
SCRIBE #1 NOTE: I, Pawan Mccain, am scribing for, and in the presence of, Anil Marie MD. I have scribed the entire note.       History     Chief Complaint   Patient presents with    Dehydration     No urine x 1 week per NH. Suspected dehydration      Review of patient's allergies indicates:  No Known Allergies      History of Present Illness     HPI    7/1/2024, 12:48 PM  History obtained from the EMS  Limited HPI and ROS d/t pt's age      History of Present Illness: Kwame Vásquez Jr. is a 89 y.o. male patient with a PMHx of glaucoma and hypothyroidism who presents to the Emergency Department for evaluation. Pt has a decreased urine output for >1 week per NH.         Arrival mode: Ambulance Service     PCP: Pooja Jimenez MD        Past Medical History:  Past Medical History:   Diagnosis Date    Glaucoma     Hypothyroidism        Past Surgical History:  Past Surgical History:   Procedure Laterality Date    CHOLECYSTECTOMY      Late 80's    ENUCLEATION Right     TONSILLECTOMY           Family History:  Family History   Problem Relation Name Age of Onset    No Known Problems Mother      No Known Problems Father         Social History:  Social History     Tobacco Use    Smoking status: Never     Passive exposure: Never    Smokeless tobacco: Never   Substance and Sexual Activity    Alcohol use: Never    Drug use: Never    Sexual activity: Not on file        Review of Systems     Review of Systems   Unable to perform ROS: Age   Genitourinary:  Positive for decreased urine volume.      Physical Exam     Initial Vitals [07/01/24 1135]   BP Pulse Resp Temp SpO2   (!) 155/60 63 18 98.2 °F (36.8 °C) 96 %      MAP       --          Physical Exam  Nursing Notes and Vital Signs Reviewed.  Constitutional: Patient is in no acute distress. Elderly. Frail. Poor historian. Cachectic.   Head: Atraumatic. Normocephalic.  Eyes: PERRL. EOM intact. Conjunctivae are not pale. No scleral icterus.  ENT: Very dry  mucous membranes. Oropharynx is clear and symmetric.    Neck: Supple. Full ROM. No lymphadenopathy.  Cardiovascular: Regular rate. Regular rhythm. No murmurs, rubs, or gallops. Distal pulses are 2+ and symmetric.  Pulmonary/Chest: No respiratory distress. Clear to auscultation bilaterally. No wheezing or rales.  Abdominal: Soft and non-distended.  There is no tenderness.  No rebound, guarding, or rigidity. Good bowel sounds.  Genitourinary: No CVA tenderness  Musculoskeletal: Moves all extremities. No obvious deformities. No edema. No calf tenderness.  Skin: Multiple skin tears.   Neurological:  Alert, awake, and appropriate. No acute focal neurological deficits are appreciated.  Psychiatric: Paranoid. Uncooperative.      ED Course   Procedures  ED Vital Signs:  Vitals:    07/01/24 1135 07/01/24 1259 07/01/24 1320 07/01/24 1435   BP: (!) 155/60  122/83 (!) 123/59   Pulse: 63 72 75 66   Resp: 18  (!) 21    Temp: 98.2 °F (36.8 °C)      TempSrc: Oral      SpO2: 96%  97% 96%    07/01/24 1505 07/01/24 1715   BP: 125/80 (!) 161/74   Pulse: 71 64   Resp:     Temp:     TempSrc:     SpO2: 96% 97%       Abnormal Lab Results:  Labs Reviewed   CBC W/ AUTO DIFFERENTIAL - Abnormal; Notable for the following components:       Result Value    WBC 14.10 (*)     Immature Granulocytes 0.6 (*)     Gran # (ANC) 12.3 (*)     Immature Grans (Abs) 0.09 (*)     Lymph # 0.9 (*)     Gran % 87.3 (*)     Lymph % 6.4 (*)     All other components within normal limits   COMPREHENSIVE METABOLIC PANEL - Abnormal; Notable for the following components:    Glucose 167 (*)     BUN 32 (*)     Albumin 3.3 (*)     Total Bilirubin 2.6 (*)     AST 63 (*)     ALT 52 (*)     All other components within normal limits   URINALYSIS, REFLEX TO URINE CULTURE - Abnormal; Notable for the following components:    Protein, UA 1+ (*)     Ketones, UA 1+ (*)     Occult Blood UA 3+ (*)     Urobilinogen, UA 2.0-3.0 (*)     All other components within normal limits     Narrative:     Specimen Source->Urine   URINALYSIS MICROSCOPIC - Abnormal; Notable for the following components:    RBC, UA >100 (*)     All other components within normal limits    Narrative:     Specimen Source->Urine   HIV 1 / 2 ANTIBODY    Narrative:     Release to patient->Immediate   HEPATITIS C ANTIBODY    Narrative:     Release to patient->Immediate   HEP C VIRUS HOLD SPECIMEN    Narrative:     Release to patient->Immediate        All Lab Results:  Results for orders placed or performed during the hospital encounter of 07/01/24   HIV 1/2 Ag/Ab (4th Gen)   Result Value Ref Range    HIV 1/2 Ag/Ab Negative Negative   Hepatitis C Antibody   Result Value Ref Range    Hepatitis C Ab Negative Negative   HCV Virus Hold Specimen   Result Value Ref Range    HEP C Virus Hold Specimen Hold for HCV sendout    CBC auto differential   Result Value Ref Range    WBC 14.10 (H) 3.90 - 12.70 K/uL    RBC 4.78 4.60 - 6.20 M/uL    Hemoglobin 14.2 14.0 - 18.0 g/dL    Hematocrit 43.2 40.0 - 54.0 %    MCV 90 82 - 98 fL    MCH 29.7 27.0 - 31.0 pg    MCHC 32.9 32.0 - 36.0 g/dL    RDW 13.1 11.5 - 14.5 %    Platelets 196 150 - 450 K/uL    MPV 11.9 9.2 - 12.9 fL    Immature Granulocytes 0.6 (H) 0.0 - 0.5 %    Gran # (ANC) 12.3 (H) 1.8 - 7.7 K/uL    Immature Grans (Abs) 0.09 (H) 0.00 - 0.04 K/uL    Lymph # 0.9 (L) 1.0 - 4.8 K/uL    Mono # 0.8 0.3 - 1.0 K/uL    Eos # 0.0 0.0 - 0.5 K/uL    Baso # 0.02 0.00 - 0.20 K/uL    nRBC 0 0 /100 WBC    Gran % 87.3 (H) 38.0 - 73.0 %    Lymph % 6.4 (L) 18.0 - 48.0 %    Mono % 5.5 4.0 - 15.0 %    Eosinophil % 0.1 0.0 - 8.0 %    Basophil % 0.1 0.0 - 1.9 %    Differential Method Automated    Comprehensive metabolic panel   Result Value Ref Range    Sodium 144 136 - 145 mmol/L    Potassium 3.8 3.5 - 5.1 mmol/L    Chloride 104 95 - 110 mmol/L    CO2 27 23 - 29 mmol/L    Glucose 167 (H) 70 - 110 mg/dL    BUN 32 (H) 10 - 30 mg/dL    Creatinine 0.9 0.5 - 1.4 mg/dL    Calcium 9.2 8.7 - 10.5 mg/dL    Total Protein  7.4 6.0 - 8.4 g/dL    Albumin 3.3 (L) 3.5 - 5.2 g/dL    Total Bilirubin 2.6 (H) 0.1 - 1.0 mg/dL    Alkaline Phosphatase 88 55 - 135 U/L    AST 63 (H) 10 - 40 U/L    ALT 52 (H) 10 - 44 U/L    eGFR >60 >60 mL/min/1.73 m^2    Anion Gap 13 8 - 16 mmol/L   Urinalysis, Reflex to Urine Culture Urine, Catheterized    Specimen: Urine   Result Value Ref Range    Specimen UA Urine, Catheterized     Color, UA Yellow Yellow, Straw, [place]    Appearance, UA Clear Clear    pH, UA 6.0 5.0 - 8.0    Specific Gravity, UA 1.030 1.005 - 1.030    Protein, UA 1+ (A) Negative    Glucose, UA Negative Negative    Ketones, UA 1+ (A) Negative    Bilirubin (UA) Negative Negative    Occult Blood UA 3+ (A) Negative    Nitrite, UA Negative Negative    Urobilinogen, UA 2.0-3.0 (A) <2.0 EU/dL    Leukocytes, UA Negative Negative   Urinalysis Microscopic   Result Value Ref Range    RBC, UA >100 (H) 0 - 4 /hpf    WBC, UA 0 0 - 5 /hpf    Bacteria Rare None-Occ /hpf    Hyaline Casts, UA 0 0-1/lpf /lpf    Microscopic Comment SEE COMMENT         Imaging Results:  Imaging Results              X-Ray Chest AP Portable (Final result)  Result time 07/01/24 16:24:59      Final result by Dominguez Steiner MD (07/01/24 16:24:59)                   Impression:      No acute abnormality.  Stable exam      Electronically signed by: Dominguez Steiner  Date:    07/01/2024  Time:    16:24               Narrative:    EXAMINATION:  XR CHEST AP PORTABLE    CLINICAL HISTORY:  Chest Pain;    TECHNIQUE:  Single frontal view of the chest was performed.    COMPARISON:  None    FINDINGS:  The lungs are clear, with normal appearance of pulmonary vasculature and no pleural effusion or pneumothorax.    The cardiac silhouette is normal in size. Ectatic aorta    Bones are intact.                                       CT Head Without Contrast (Final result)  Result time 07/01/24 15:58:10      Final result by Vincent Hammond MD (07/01/24 15:58:10)                   Impression:      No acute  intracranial abnormalities.  Motion limited.    All CT scans at this facility use dose modulation, iterative reconstruction, and/or weight based dosing when appropriate to reduce radiation dose to as low as reasonable achievable.      Electronically signed by: Vincent Hammond MD  Date:    07/01/2024  Time:    15:58               Narrative:    EXAMINATION:  CT HEAD WITHOUT CONTRAST    CLINICAL HISTORY:  Mental status change, unknown cause;    TECHNIQUE:  Low dose axial CT images obtained throughout the head without intravenous contrast. Sagittal and coronal reconstructions were performed.    COMPARISON:  12/17/2023    FINDINGS:  Motion limited    Intracranial compartment:    The brain parenchyma demonstrates areas of decreased attenuation with moderate periventricular white matter consistent with chronic microvascular ischemic changes..  No parenchymal mass, hemorrhage, edema or major vascular distribution infarct.  Vascular calcifications are noted.    Moderate prominence of the sulci and ventricles are consistent with age-related involutional changes.    No extra-axial blood or fluid collections.    Skull/extracranial contents (limited evaluation): Prosthesis right globe.  No fracture. Mastoid air cells and paranasal sinuses are essentially clear.                                                The Emergency Provider reviewed the vital signs and test results, which are outlined above.     ED Discussion     2:58 PM: Pt's son Della states that pt is a DNR. Per son, pt's mental status has changed since moving into [facility] 1 week ago. Pt is paranoid and not taking any medications. He is worried people are trying to beat him. Per son, pt's mental status is altered and not at baseline.     5:31 PM: Spoke with patients son Della who is the power of  in regards to administering 1 L of fluid and discharging patient. Patient voices desire to leave hospital. D/w power of  in length need for further  evaluation and treatment due to HPI and PEx. Pt declines any further evaluation or tx at this time. All risks, including worsening sx, permanent bodily harm and death, were discussed in length. Pt acknowledges all risk at this time and agrees to sign AMA form. Pt given RTER instructions. All questions and concerns addressed at this time. Pt leaving AMA.       ED Course as of 07/01/24 1938 Mon Jul 01, 2024   1451 BUN(!): 32 [ROSIO]   1451 Creatinine: 0.9 [ROSIO]   1451 WBC(!): 14.10 [ROSIO]   1452 RBC, UA(!): >100 [ROSIO]   1452 WBC, UA: 0 [ROSIO]   1632 CT Head Without Contrast  No intracranial hemorrhage noted [ROSIO]      ED Course User Index  [ROSIO] Anil Marie MD     Medical Decision Making  Problems Addressed:  Dehydration: acute illness or injury that poses a threat to life or bodily functions    Amount and/or Complexity of Data Reviewed  Independent Historian: caregiver     Details:  Son states patient is a DNR -he is power-of-  Labs: ordered. Decision-making details documented in ED Course.  Radiology: ordered. Decision-making details documented in ED Course.    Risk  Decision regarding hospitalization.  Diagnosis or treatment significantly limited by social determinants of health.  Risk Details:  Advance Care Planning    Date: 07/01/2024    Power of   I initiated the process of voluntary advance care planning today and explained the importance of this process to the patient.  I introduced the concept of advance directives to the patient, as well. Then the patient received detailed information about the importance of designating a Health Care Power of  (HCPOA). He was also instructed to communicate with this person about their wishes for future healthcare, should he become sick and lose decision-making capacity. The patient has previously appointed a HCPOA. After our discussion, the patient has decided to complete a HCPOA and has appointed his son, health care agent: Della.      A total of 15 min was  spent on advance care planning, goals of care discussion, emotional support, formulating and communicating prognosis and exploring burden/benefit of various approaches of treatment. This discussion occurred on a fully voluntary basis with the verbal consent of the patient and/or family.          Advance Directives:   [place]: No    Do Not Resuscitate Status: Yes    Medical Power of : Yes (son-Della)    Agent's Name:  Della Sierra    Decision Making:  Family answered questions  Goals of Care: The family endorses that what is most important right now is to focus on avoiding the hospital    Accordingly, we have decided that the best plan to meet the patient's goals includes no further escalation in treatment              ED Medication(s):  Medications   sodium chloride 0.9% bolus 1,000 mL 1,000 mL (0 mLs Intravenous Stopped 7/1/24 1414)   sodium chloride 0.9% bolus 1,000 mL 1,000 mL (1,000 mLs Intravenous New Bag 7/1/24 1741)       New Prescriptions    No medications on file               Scribe Attestation:   Scribe #1: I performed the above scribed service and the documentation accurately describes the services I performed. I attest to the accuracy of the note.     Attending:   Physician Attestation Statement for Scribe #1: I, Anil Marie MD, personally performed the services described in this documentation, as scribed by Pawan Mccain, in my presence, and it is both accurate and complete.           Clinical Impression       ICD-10-CM ICD-9-CM   1. Dehydration  E86.0 276.51   2. Chest pain  R07.9 786.50       Disposition:   Disposition: AMA  Condition: Stable         Anil Marie MD  07/02/24 1942

## 2024-07-01 NOTE — CONSULTS
[facility] - Emergency Dept.  Hospital Medicine  Consult Note    Patient Name: Kwame Vásquez Jr.  MRN: [id number]  Admission Date: 7/1/2024  Hospital Length of Stay: 0 days  Attending Physician: Anil Marie MD   Primary Care Provider: Pooja Jimenez MD           Patient information was obtained from relative(s), past medical records, and ER records.     Consults  Subjective:     Principal Problem: <principal problem not specified>    Chief Complaint:   Chief Complaint   Patient presents with    Dehydration     No urine x 1 week per NH. Suspected dehydration         HPI: Pt is a 89 YO  male with PMH Notable for dementia, hypothyroidism, prior CVA, BPH, HLD who presented to the ED from nursing home for 1 week hx of decreased urine output, worsening confusion and decreased PO intake. In the ED, initial VS: temp afebrile, HR 63, /60, sats 96% on room air. Work up notable for: WBC 14, BMP with BUN 32, Cr 0.9 (stable from prev), UA + ketones, 3+ blood but neg for UTI. CT head neg for acute changes. PT received 1L NS in the ED. Bladder scan showed only 268 ml of urine, s/p straight cath in the ED. Hospital medicine was consulted for admission for observation and management of dehydration.     Pt seen in ED with daughter in law Seda at bedside. She states that she would prefer for patient to receive additional fluids in the ED and then be discharged back to nursing home. She does not want him to be admitted to the hospital. This was discussed with ED provider Dr. Shaw.     Past Medical History:   Diagnosis Date    Glaucoma     Hypothyroidism        Past Surgical History:   Procedure Laterality Date    CHOLECYSTECTOMY      Late 80's    ENUCLEATION Right     TONSILLECTOMY         Review of patient's allergies indicates:  No Known Allergies    No current facility-administered medications on file prior to encounter.     Current Outpatient Medications on File Prior to Encounter   Medication Sig     aspirin (ECOTRIN) 81 MG EC tablet Take 1 tablet (81 mg total) by mouth once daily.    atorvastatin (LIPITOR) 20 MG tablet Take 1 tablet (20 mg total) by mouth once daily.    azelastine (ASTELIN) 137 mcg (0.1 %) nasal spray 1 spray (137 mcg total) by Nasal route 2 (two) times daily.    brimonidine 0.2% (ALPHAGAN) 0.2 % Drop Place 1 drop into the left eye 2 (two) times daily.    cetirizine (ZYRTEC) 10 MG tablet TAKE ONE TABLET BY MOUTH EVERY DAY AS NEEDED FOR ALLERGIES    clonazePAM (KLONOPIN) 0.5 MG tablet TAKE ONE TABLET BY MOUTH EVERY DAY IN AFTERNOON FOR AGITATION    erythromycin (ROMYCIN) ophthalmic ointment Place into both eyes every evening.    finasteride (PROSCAR) 5 mg tablet Take 1 tablet (5 mg total) by mouth once daily.    lactulose (CHRONULAC) 20 gram/30 mL Soln Take 15 mLs (10 g total) by mouth 2 (two) times daily as needed (constipation).    latanoprost 0.005 % ophthalmic solution Place 1 drop into the left eye every evening.    levothyroxine (SYNTHROID) 88 MCG tablet Take 1 tablet (88 mcg total) by mouth before breakfast.    senna (SENOKOT) 8.6 mg tablet Take 1 tablet by mouth once daily.    tamsulosin (FLOMAX) 0.4 mg Cap Take 1 capsule (0.4 mg total) by mouth once daily.    temazepam (RESTORIL) 30 mg capsule TAKE ONE CAPSULE BY MOUTH AT BEDTIME FOR INSOMNIA     Family History       Problem Relation (Age of Onset)    No Known Problems Mother, Father          Tobacco Use    Smoking status: Never     Passive exposure: Never    Smokeless tobacco: Never   Substance and Sexual Activity    Alcohol use: Never    Drug use: Never    Sexual activity: Not on file     Review of Systems   Unable to perform ROS: Dementia     Objective:     Vital Signs (Most Recent):  Temp: 98.2 °F (36.8 °C) (07/01/24 1135)  Pulse: 71 (07/01/24 1505)  Resp: (!) 21 (07/01/24 1320)  BP: 125/80 (07/01/24 1505)  SpO2: 96 % (07/01/24 1505) Vital Signs (24h Range):  Temp:  [98.2 °F (36.8 °C)] 98.2 °F (36.8 °C)  Pulse:  [63-75] 71  Resp:   [18-21] 21  SpO2:  [96 %-97 %] 96 %  BP: (122-155)/(59-83) 125/80        There is no height or weight on file to calculate BMI.     Physical Exam  Vitals and nursing note reviewed. Exam conducted with a chaperone present.   Constitutional:       Comments: Frail, chronically ill appearing, disoriented    HENT:      Mouth/Throat:      Mouth: Mucous membranes are dry.   Cardiovascular:      Rate and Rhythm: Normal rate and regular rhythm.   Pulmonary:      Effort: Pulmonary effort is normal. No respiratory distress.      Breath sounds: No wheezing.      Comments: Sats 94-95% on room air   Abdominal:      General: Bowel sounds are normal. There is no distension.      Palpations: Abdomen is soft.      Tenderness: There is no abdominal tenderness.   Genitourinary:     Comments: deferred  Musculoskeletal:      Right lower leg: No edema.      Left lower leg: No edema.   Neurological:      Mental Status: He is disoriented.          Significant Labs: All pertinent labs within the past 24 hours have been reviewed.    Significant Imaging: I have reviewed all pertinent imaging results/findings within the past 24 hours.  Assessment/Plan:     Dehydration  Pt family does not want him to be admitted at this time, request additional fluids in the ED and discharge back to nursing home so that patient can be at home.   Discussed with ER provider Dr. 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 Dementia is severe. The patient does have signs of behavioral disturbance.   PT with reports of decreased PO intake, refusal of medications and decreased UOP over last week per ER records  Per review of NH records, pt is DNR and pt's surrogate decision maker is listed as Daughter in law  Seda Sierra [phone]       VTE Risk Mitigation (From admission, onward)       None                Please call hospital medicine with any further questions or concerns.     Phyllis Welch MD  Department of Hospital Medicine   [facility] - Emergency Dept.

## 2024-07-02 NOTE — ED NOTES
Contacted [facility] to confirm transport had been set up [facility] but they stated that they did not have anything in their system. I contacted the NH again and spoke with the nurse who states that she was the one who called them to set up the transport approx 1 hour ago. I informed her that they did not have anything in their system and asked if she could call them back to set up/confirm. The nurse said she was going to call them back and she would f/u w me. I will f/u with PATRICIO shortly to reconfirm.

## 2024-09-25 NOTE — PROGRESS NOTES
Subjective:       Patient ID: Kwame Vásquez Jr. is a 89 y.o. male.    Chief Complaint: No chief complaint on file.  The patient location is: [place]   The chief complaint leading to consultation is: completion of paperwork    Visit type: audiovisual    Face to Face time with patient: 25 3  30 minutes of total time spent on the encounter, which includes face to face time and non-face to face time preparing to see the patient (eg, review of tests), Obtaining and/or reviewing separately obtained history, Documenting clinical information in the electronic or other health record, Independently interpreting results (not separately reported) and communicating results to the patient/family/caregiver, or Care coordination (not separately reported).         Each patient to whom he or she provides medical services by telemedicine is:  (1) informed of the relationship between the physician and patient and the respective role of any other health care provider with respect to management of the patient; and (2) notified that he or she may decline to receive medical services by telemedicine and may withdraw from such care at any time.    Notes:  pt reports vis tele medicine with daughter who is POA and current sitter.  Needs completion of paperwork for nursing home.  Currently in an assisted living with 24hour sitters.  Total dependence with ADLs.  Sitters prepare meals, feed, bath.  Experiences worsening confusion at night.  Worsening RLS sympto,s.  No combative behavior    Past Medical History:   Diagnosis Date    Glaucoma     Hypothyroidism       Active Problem List with Overview Notes    Diagnosis Date Noted    Dehydration 07/01/2024    Moderate Alzheimer's dementia without behavioral disturbance, psychotic disturbance, mood disturbance, or anxiety, unspecified timing of dementia onset 06/14/2024    Thrombocytopenia, unspecified 06/14/2024    Aortic atherosclerosis 06/14/2024    Dysarthria and anarthria 03/30/2023     Traumatic closed displaced fracture of shaft of clavicle with delayed healing, right 05/05/2021    DM type 2 with diabetic dyslipidemia 10/31/2019    Hypothyroid 06/03/2019    Glaucoma 06/03/2019    Primary insomnia 10/28/2016    Senile debility 10/28/2016    Allergic rhinitis 04/02/2015    Irritable bowel syndrome 02/18/2014    BPH (benign prostatic hyperplasia) 05/01/2012    Hyperlipemia 05/01/2012      Current Outpatient Medications on File Prior to Visit   Medication Sig Dispense Refill    aspirin (ECOTRIN) 81 MG EC tablet Take 1 tablet (81 mg total) by mouth once daily. 90 tablet 3    atorvastatin (LIPITOR) 20 MG tablet Take 1 tablet (20 mg total) by mouth once daily. 90 tablet 3    azelastine (ASTELIN) 137 mcg (0.1 %) nasal spray 1 spray (137 mcg total) by Nasal route 2 (two) times daily. 30 mL 2    brimonidine 0.2% (ALPHAGAN) 0.2 % Drop Place 1 drop into the left eye 2 (two) times daily. 15 mL 4    cetirizine (ZYRTEC) 10 MG tablet TAKE ONE TABLET BY MOUTH EVERY DAY AS NEEDED FOR ALLERGIES 30 tablet 3    clonazePAM (KLONOPIN) 0.5 MG tablet TAKE ONE TABLET BY MOUTH EVERY DAY IN AFTERNOON FOR AGITATION 30 tablet 3    erythromycin (ROMYCIN) ophthalmic ointment Place into both eyes every evening. 3.5 g 1    finasteride (PROSCAR) 5 mg tablet Take 1 tablet (5 mg total) by mouth once daily. 90 tablet 3    lactulose (CHRONULAC) 20 gram/30 mL Soln Take 15 mLs (10 g total) by mouth 2 (two) times daily as needed (constipation). 300 mL 6    latanoprost 0.005 % ophthalmic solution Place 1 drop into the left eye every evening. 2.5 mL 6    levothyroxine (SYNTHROID) 88 MCG tablet Take 1 tablet (88 mcg total) by mouth before breakfast. 90 tablet 3    senna (SENOKOT) 8.6 mg tablet Take 1 tablet by mouth once daily.      tamsulosin (FLOMAX) 0.4 mg Cap Take 1 capsule (0.4 mg total) by mouth once daily. 90 capsule 3    temazepam (RESTORIL) 30 mg capsule TAKE ONE CAPSULE BY MOUTH AT BEDTIME FOR INSOMNIA 90 capsule 3     No current  facility-administered medications on file prior to visit.      HPI  Review of Systems   Constitutional:  Negative for activity change and unexpected weight change.   HENT:  Positive for hearing loss, rhinorrhea and trouble swallowing.    Eyes:  Positive for discharge and visual disturbance.   Respiratory:  Positive for wheezing. Negative for chest tightness.    Cardiovascular:  Negative for chest pain and palpitations.   Gastrointestinal:  Positive for constipation. Negative for blood in stool, diarrhea and vomiting.   Endocrine: Negative for polydipsia and polyuria.   Genitourinary:  Negative for difficulty urinating, hematuria and urgency.   Musculoskeletal:  Positive for arthralgias. Negative for joint swelling and neck pain.   Neurological:  Positive for weakness. Negative for headaches.   Psychiatric/Behavioral:  Positive for confusion. Negative for dysphoric mood.        Objective:      Physical Exam  HENT:      Head: Normocephalic.   Pulmonary:      Effort: No respiratory distress (speaks without panting).   Musculoskeletal:      Comments: wheelchair   Skin:     Coloration: Skin is not jaundiced.   Neurological:      Mental Status: He is alert. He is disoriented.         Assessment:       1. Moderate Alzheimer's dementia without behavioral disturbance, psychotic disturbance, mood disturbance, or anxiety, unspecified timing of dementia onset    2. Thrombocytopenia, unspecified    3. Aortic atherosclerosis    4. DM type 2 with diabetic dyslipidemia        Plan:   1. Moderate Alzheimer's dementia without behavioral disturbance, psychotic disturbance, mood disturbance, or anxiety, unspecified timing of dementia onset    2. Thrombocytopenia, unspecified    3. Aortic atherosclerosis    4. DM type 2 with diabetic dyslipidemia    Stable continue current treatment plan.  Paperwork completed     No follow-ups on file.

## 2025-01-14 DIAGNOSIS — Z00.00 ENCOUNTER FOR MEDICARE ANNUAL WELLNESS EXAM: ICD-10-CM

## 2025-09-05 ENCOUNTER — PATIENT OUTREACH (OUTPATIENT)
Dept: ADMINISTRATIVE | Facility: HOSPITAL | Age: OVER 89
End: 2025-09-05
Payer: MEDICARE